# Patient Record
Sex: MALE | Race: BLACK OR AFRICAN AMERICAN | Employment: UNEMPLOYED | URBAN - METROPOLITAN AREA
[De-identification: names, ages, dates, MRNs, and addresses within clinical notes are randomized per-mention and may not be internally consistent; named-entity substitution may affect disease eponyms.]

---

## 2018-05-15 ENCOUNTER — TELEPHONE (OUTPATIENT)
Dept: FAMILY MEDICINE CLINIC | Age: 48
End: 2018-05-15

## 2018-05-15 NOTE — TELEPHONE ENCOUNTER
Patient notified by voice mail that we do not have a new patient appointment any sooner than patient already has scheduled. If a sooner appointment is needed patient can check with our [facility] office for availability.

## 2018-06-11 ENCOUNTER — HOSPITAL ENCOUNTER (INPATIENT)
Age: 48
LOS: 11 days | Discharge: HOME | DRG: 885 | End: 2018-06-22
Attending: PSYCHIATRY & NEUROLOGY | Admitting: PSYCHIATRY & NEUROLOGY
Payer: MEDICARE

## 2018-06-11 PROBLEM — F20.9 SCHIZOPHRENIA (HCC): Status: ACTIVE | Noted: 2018-06-11

## 2018-06-11 PROCEDURE — 65220000003 HC RM SEMIPRIVATE PSYCH

## 2018-06-11 RX ORDER — ACETAMINOPHEN 325 MG/1
650 TABLET ORAL
Status: DISCONTINUED | OUTPATIENT
Start: 2018-06-11 | End: 2018-06-22 | Stop reason: HOSPADM

## 2018-06-11 RX ORDER — BENZTROPINE MESYLATE 2 MG/1
2 TABLET ORAL
Status: DISCONTINUED | OUTPATIENT
Start: 2018-06-11 | End: 2018-06-22 | Stop reason: HOSPADM

## 2018-06-11 RX ORDER — IBUPROFEN 200 MG
1 TABLET ORAL
Status: DISCONTINUED | OUTPATIENT
Start: 2018-06-11 | End: 2018-06-22 | Stop reason: HOSPADM

## 2018-06-11 RX ORDER — LORAZEPAM 1 MG/1
1 TABLET ORAL
Status: DISCONTINUED | OUTPATIENT
Start: 2018-06-11 | End: 2018-06-22 | Stop reason: HOSPADM

## 2018-06-11 RX ORDER — ZOLPIDEM TARTRATE 10 MG/1
10 TABLET ORAL
Status: DISCONTINUED | OUTPATIENT
Start: 2018-06-11 | End: 2018-06-22 | Stop reason: HOSPADM

## 2018-06-11 RX ORDER — IBUPROFEN 400 MG/1
400 TABLET ORAL
Status: DISCONTINUED | OUTPATIENT
Start: 2018-06-11 | End: 2018-06-22 | Stop reason: HOSPADM

## 2018-06-11 RX ORDER — ADHESIVE BANDAGE
30 BANDAGE TOPICAL DAILY PRN
Status: DISCONTINUED | OUTPATIENT
Start: 2018-06-11 | End: 2018-06-22 | Stop reason: HOSPADM

## 2018-06-11 RX ORDER — OLANZAPINE 5 MG/1
5 TABLET ORAL
Status: DISCONTINUED | OUTPATIENT
Start: 2018-06-11 | End: 2018-06-22 | Stop reason: HOSPADM

## 2018-06-11 RX ORDER — LORAZEPAM 2 MG/ML
2 INJECTION INTRAMUSCULAR
Status: DISCONTINUED | OUTPATIENT
Start: 2018-06-11 | End: 2018-06-22 | Stop reason: HOSPADM

## 2018-06-11 RX ORDER — BENZTROPINE MESYLATE 1 MG/ML
2 INJECTION INTRAMUSCULAR; INTRAVENOUS
Status: DISCONTINUED | OUTPATIENT
Start: 2018-06-11 | End: 2018-06-22 | Stop reason: HOSPADM

## 2018-06-11 NOTE — IP AVS SNAPSHOT
[address] [facility] 
 
 
 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 
[phone] None   None (395) Patient stated that they have no PCP Discharge Orders None A check tom indicates which time of day the medication should be taken. My Medications START taking these medications Instructions Each Dose to Equal  
 Morning Noon Evening Bedtime ARIPiprazole 30 mg tablet Commonly known as:  ABILIFY Start taking on:  6/23/2018 Your last dose was: Your next dose is: Take 1 Tab by mouth daily for 7 days. Indications: Schizophrenia 30 mg  
    
   
   
   
  
 divalproex  mg ER tablet Commonly known as:  DEPAKOTE ER Your last dose was: Your next dose is: Take 3 Tabs by mouth nightly for 7 days. Indications: Schizophrenia  
 1500 mg Where to Get Your Medications Information on where to get these meds will be given to you by the nurse or doctor. ! Ask your nurse or doctor about these medications ARIPiprazole 30 mg tablet  
 divalproex  mg ER tablet Discharge Instructions DISCHARGE SUMMARY from Nurse PATIENT INSTRUCTIONS: 
What to do at Home: Recommended activity: Activity as tolerated *  Please give a list of your current medications to your Primary Care Provider. *  Please update this list whenever your medications are discontinued, doses are 
    changed, or new medications (including over-the-counter products) are added. *  Please carry medication information at all times in case of emergency situations. These are general instructions for a healthy lifestyle: No smoking/ No tobacco products/ Avoid exposure to second hand smoke Surgeon General's Warning:  Quitting smoking now greatly reduces serious risk to your health. Obesity, smoking, and sedentary lifestyle greatly increases your risk for illness A healthy diet, regular physical exercise & weight monitoring are important for maintaining a healthy lifestyle The discharge information has been reviewed with the patient. The patient verbalized understanding. Discharge medications reviewed with the patient and appropriate educational materials and side effects teaching were provided. ___________________________________________________________________________________________________________________________________ Minor Ronde If I feel I am at risk of hurting myself or others, I will call the crisis office and speak with a crisis worker who will assist me during my crisis. Trae [facility]  [phone] [address] [place] [address] [phone] [place] [address]  [phone] [address]-   [phone] [place]-  [phone]  [facility]  [phone] [address] [place] [address]  [phone] [employer] Announcement We are excited to announce that we are making your provider's discharge notes available to you in [employer]. You will see these notes when they are completed and signed by the physician that discharged you from your recent hospital stay.   If you have any questions or concerns about any information you see in [employer], please call the [employer] Information Department where you were seen or reach out to your Primary Care Provider for more information about your plan of care. Introducing [facility]! [facility] introduces Mi[employer] patient portal. Now you can access parts of your medical record, email your doctor's office, and request medication refills online. 1. In your internet browser, go to [email or web address] 2. Click on the First Time User? Click Here link in the Sign In box. You will see the New Member Sign Up page. 3. Enter your Mi[employer] Access Code exactly as it appears below. You will not need to use this code after youve completed the sign-up process. If you do not sign up before the expiration date, you must request a new code. · [employer] Access Code: [email or web address] Expires: 9/16/2018  9:10 AM 
 
4. Enter the last four digits of your Social Security Number (xxxx) and Date of Birth (mm/dd/yyyy) as indicated and click Submit. You will be taken to the next sign-up page. 5. Create a Mi[employer] ID. This will be your [employer] login ID and cannot be changed, so think of one that is secure and easy to remember. 6. Create a [employer] password. You can change your password at any time. 7. Enter your Password Reset Question and Answer. This can be used at a later time if you forget your password. 8. Enter your e-mail address. You will receive e-mail notification when new information is available in [address] 9. Click Sign Up. You can now view and download portions of your medical record. 10. Click the Download Summary menu link to download a portable copy of your medical information. If you have questions, please visit the Frequently Asked Questions section of the [employer] website. Remember, MiNOW[employer]less is NOT to be used for urgent needs. For medical emergencies, dial 911. Now available from your iPhone and Android! Introducing [employer] As a [employer] patient, I wanted to make you aware of our electronic visit tool called Walker ClarkAggios. [employer] 24/7 allows you to connect within minutes with a medical provider 24 hours a day, seven days a week via a mobile device or tablet or logging into a secure website from your computer. You can access Walker Clarkfin from anywhere in the [place]. A virtual visit might be right for you when you have a simple condition and feel like you just dont want to get out of bed, or cant get away from work for an appointment, when your regular [employer] provider is not available (evenings, weekends or holidays), or when youre out of town and need minor care. Electronic visits cost only $49 and if the [employer] 24/7 provider determines a prescription is needed to treat your condition, one can be electronically transmitted to a nearby pharmacy*. Please take a moment to enroll today if you have not already done so. The enrollment process is free and takes just a few minutes. To enroll, please download the [employer] 24/7 lucy to your tablet or phone, or visit [email or web address] to enroll on your computer. And, as an [address] [place] [address] patient with a [employer] account, the results of your visits will be scanned into your electronic medical record and your primary care provider will be able to view the scanned results. We urge you to continue to see your regular [employer] provider for your ongoing medical care. And while your primary care provider may not be the one available when you seek a Walker Mistryvirginiafin virtual visit, the peace of mind you get from getting a real diagnosis real time can be priceless. For more information on Walker Fedevirginiafin, view our Frequently Asked Questions (FAQs) at [email or web address]. Sincerely, 
 
Karolina Colby MD 
Chief Medical Officer [facility] *:  certain medications cannot be prescribed via [employer] Unresulted tests-please follow up with your PCP on these results Procedure/Test Authorizing Provider Deven Velasco MD  
 LIPID PANEL Jannette Jacob MD  
 TSH 3RD GENERATION Jannette Jacob MD  
 VALPROIC ACID Wen Harvey MD  
 VALPROIC ACID Cristian Stewart MD  
  
Providers Seen During Your Hospitalization Provider Specialty Primary office phone Wen Harvey MD Psychiatry [phone] Cristian Stewart MD Psychiatry [phone] Your Primary Care Physician (PCP) Primary Care Physician Office Phone Office Fax NONE ** None ** ** None ** You are allergic to the following No active allergies Recent Documentation Height Weight BMI  
  
  
 1.575 m 67.6 kg 25.58 kg/m2 Emergency Contacts Name Discharge Info Relation Home Work Mobile [employer] [1] Brother [24] [phone] Ramón Martines [1] Mother [14] [phone] Caitlin Ponce [1] Brother [24] [phone] Patient Belongings The following personal items are in your possession at time of discharge: 
  Dental Appliances: None         Home Medications: Locked   Jewelry: None  Clothing: Shirt, With patient    Other Valuables: None  Personal Items Sent to Safe: wallet, 2 cell phones Please provide this summary of care documentation to your next provider. Signatures-by signing, you are acknowledging that this After Visit Summary has been reviewed with you and you have received a copy. Patient Signature:  ____________________________________________________________ Date:  ____________________________________________________________  
  
Arna [place] Provider Signature:  ____________________________________________________________ Date:  ____________________________________________________________

## 2018-06-11 NOTE — IP AVS SNAPSHOT
Summary of Care Report The Summary of Care report has been created to help improve care coordination. Users with access to [employer] or [address] (Web-based application) may access additional patient information including the Discharge Summary. If you are not currently a [address] user and need more information, please call the number listed below in the [place] [address] section and ask to be connected with Medical Records. Facility Information Name Address Phone [facility] [address] [place] [phone] Patient Information Patient Name Sex CAIO Pizano ([id number]) Male 1970 Discharge Information Admitting Provider Service Area Unit Satish Vail MD / [address] [place] [address] / [phone] Discharge Provider Discharge Date/Time Discharge Disposition Destination (none) 2018 Afternoon (Pending) AHR (none) Patient Language Language ENGLISH [13] Hospital Problems as of 2018  Never Reviewed Class Noted - Resolved Last Modified POA Active Problems * (Principal)Schizophrenia ([facility] [address]  2018 - Present 2018 by Padma Dorman MD Unknown Entered by Satish Vail MD  
  Kallmann syndrome [facility])  2018 - Present 2018 by Satish Vail MD Yes Entered by Satish Vail MD  
  
You are allergic to the following No active allergies Current Discharge Medication List  
  
START taking these medications Dose & Instructions Dispensing Information Comments ARIPiprazole 30 mg tablet Commonly known as:  ABILIFY Start taking on:  2018 Dose:  30 mg Take 1 Tab by mouth daily for 7 days. Indications: Schizophrenia Quantity:  7 Tab Refills:  0  
   
 divalproex  mg ER tablet Commonly known as:  DEPAKOTE ER Dose:  1500 mg Take 3 Tabs by mouth nightly for 7 days. Indications: Schizophrenia Quantity:  21 Tab Refills:  0 Follow-up Information Follow up With Details Comments Contact Info [address] [place] [address]  Follow up to establish psychiatric & case management services through same day access Mon-Wed/7:30-3pm, Thurs./7:30-5:30pm, Fri./8-11:00am [address] Dereck Browne [place] [address] [place] [address] 
[phone] None   None (395) Patient stated that they have no PCP Discharge Instructions DISCHARGE SUMMARY from Nurse PATIENT INSTRUCTIONS: 
What to do at Home: 
Recommended activity: Activity as tolerated *  Please give a list of your current medications to your Primary Care Provider. *  Please update this list whenever your medications are discontinued, doses are 
    changed, or new medications (including over-the-counter products) are added. *  Please carry medication information at all times in case of emergency situations. These are general instructions for a healthy lifestyle: No smoking/ No tobacco products/ Avoid exposure to second hand smoke Surgeon General's Warning:  Quitting smoking now greatly reduces serious risk to your health. Obesity, smoking, and sedentary lifestyle greatly increases your risk for illness A healthy diet, regular physical exercise & weight monitoring are important for maintaining a healthy lifestyle The discharge information has been reviewed with the patient. The patient verbalized understanding. Discharge medications reviewed with the patient and appropriate educational materials and side effects teaching were provided. ___________________________________________________________________________________________________________________________________ Lambert Rojas If I feel I am at risk of hurting myself or others, I will call the crisis office and speak with a crisis worker who will assist me during my crisis. Claudio Vicente [facility]  [phone] [address] [place] [address] [phone] [place] [address]  [phone] [employer] [address]-   [phone] [employer]-  [phone] 8[address] [facility]  [phone] [facility]  [phone] Chart Review Routing History No Routing History on File

## 2018-06-11 NOTE — IP AVS SNAPSHOT
[address] [facility] 
 
 
 [address] Patient: Robby Gambino MRN: [id number] PBU:96/9/1363 A check tom indicates which time of day the medication should be taken. My Medications START taking these medications Instructions Each Dose to Equal  
 Morning Noon Evening Bedtime ARIPiprazole 30 mg tablet Commonly known as:  ABILIFY Start taking on:  6/23/2018 Your last dose was: Your next dose is: Take 1 Tab by mouth daily for 7 days. Indications: Schizophrenia 30 mg  
    
   
   
   
  
 divalproex  mg ER tablet Commonly known as:  DEPAKOTE ER Your last dose was: Your next dose is: Take 3 Tabs by mouth nightly for 7 days. Indications: Schizophrenia  
 1500 mg Where to Get Your Medications Information on where to get these meds will be given to you by the nurse or doctor. ! Ask your nurse or doctor about these medications ARIPiprazole 30 mg tablet  
 divalproex  mg ER tablet

## 2018-06-12 LAB
CHOLEST SERPL-MCNC: 191 MG/DL
GLUCOSE P FAST SERPL-MCNC: 85 MG/DL (ref 65–100)
HDLC SERPL-MCNC: 71 MG/DL
HDLC SERPL: 2.7 {RATIO} (ref 0–5)
LDLC SERPL CALC-MCNC: 112.6 MG/DL (ref 0–100)
LIPID PROFILE,FLP: ABNORMAL
TRIGL SERPL-MCNC: 37 MG/DL (ref ?–150)
TSH SERPL DL<=0.05 MIU/L-ACNC: 1.21 UIU/ML (ref 0.36–3.74)
VLDLC SERPL CALC-MCNC: 7.4 MG/DL

## 2018-06-12 PROCEDURE — 36415 COLL VENOUS BLD VENIPUNCTURE: CPT | Performed by: PSYCHIATRY & NEUROLOGY

## 2018-06-12 PROCEDURE — 80061 LIPID PANEL: CPT | Performed by: PSYCHIATRY & NEUROLOGY

## 2018-06-12 PROCEDURE — 74011000250 HC RX REV CODE- 250: Performed by: PSYCHIATRY & NEUROLOGY

## 2018-06-12 PROCEDURE — 65220000003 HC RM SEMIPRIVATE PSYCH

## 2018-06-12 PROCEDURE — 74011250636 HC RX REV CODE- 250/636: Performed by: PSYCHIATRY & NEUROLOGY

## 2018-06-12 PROCEDURE — 84443 ASSAY THYROID STIM HORMONE: CPT | Performed by: PSYCHIATRY & NEUROLOGY

## 2018-06-12 PROCEDURE — 82947 ASSAY GLUCOSE BLOOD QUANT: CPT | Performed by: PSYCHIATRY & NEUROLOGY

## 2018-06-12 RX ORDER — HALOPERIDOL 5 MG/1
5 TABLET ORAL 2 TIMES DAILY
Status: DISCONTINUED | OUTPATIENT
Start: 2018-06-12 | End: 2018-06-13

## 2018-06-12 RX ORDER — DIVALPROEX SODIUM 500 MG/1
500 TABLET, DELAYED RELEASE ORAL 2 TIMES DAILY
Status: DISCONTINUED | OUTPATIENT
Start: 2018-06-12 | End: 2018-06-13

## 2018-06-12 RX ADMIN — LORAZEPAM 2 MG: 2 INJECTION INTRAMUSCULAR; INTRAVENOUS at 12:07

## 2018-06-12 RX ADMIN — WATER 20 MG: 1 INJECTION INTRAMUSCULAR; INTRAVENOUS; SUBCUTANEOUS at 12:07

## 2018-06-12 NOTE — H&P
INITIAL PSYCHIATRIC EVALUATION            IDENTIFICATION:    Patient Name  Norwood Bernheim   Date of Birth 1970   [facility] [id number]   Medical Record Number  [id number]      Age  52 y.o. PCP None   Admit date:  6/11/2018    Room Number  764/51  @ [address]   Date of Service  6/12/2018            HISTORY         REASON FOR HOSPITALIZATION:  CC:  Pt admitted under a temporary USP order (TDO)  for  psychosis  proving to be an imminent danger to self and an inability to care for self. HISTORY OF PRESENT ILLNESS:    The patient, Norwood Bernheim, is a 52 y.o. BLACK [place]  male with a past psychiatric history significant for psychosis and aggression , who presents at this time with complaints of (and/or evidence of) the following emotional symptoms: agitation, delusions, psychotic behavior, paranoid behavior and psychosis. Additional symptomatology include increased irritability and poor concentration. The above symptoms have been present for months. These symptoms are of moderate severity. These symptoms are constant in nature. The patient's condition has been precipitated by legal issues and psychosocial stressors (homelessness  ). He denied using drugs and he stated he is not to be here and he was bailed out of [place] and he was trespassing and he does not want to be here and he had medications side effects like gynecomastia     ALLERGIES: No Known Allergies   MEDICATIONS PRIOR TO ADMISSION:   No prescriptions prior to admission. PAST MEDICAL HISTORY:   No past medical history on file. No past surgical history on file. SOCIAL HISTORY: single    Social History     Social History    Marital status: SINGLE     Spouse name: N/A    Number of children: N/A    Years of education: N/A     Occupational History    Not on file.      Social History Main Topics    Smoking status: Not on file    Smokeless tobacco: Not on file    Alcohol use Not on file    Drug use: Not on file    Sexual activity: Not on file     Other Topics Concern    Not on file     Social History Narrative      FAMILY HISTORY: History reviewed. No pertinent family history. No family history on file. REVIEW OF SYSTEMS:   History obtained from the patient  Pertinent items are noted in the History of Present Illness. All other Systems reviewed and are considered negative. MENTAL STATUS EXAM & VITALS     MENTAL STATUS EXAM (MSE):    MSE FINDINGS ARE WITHIN NORMAL LIMITS (WNL) UNLESS OTHERWISE STATED BELOW. ( ALL OF THE BELOW CATEGORIES OF THE MSE HAVE BEEN REVIEWED (reviewed 6/12/2018) AND UPDATED AS DEEMED APPROPRIATE )  General Presentation age appropriate, guarded   Orientation oriented to time, place and person   Vital Signs  See below (reviewed 6/12/2018); Vital Signs (BP, Pulse, & Temp) are within normal limits if not listed below.    Gait and Station Stable/steady, no ataxia   Musculoskeletal System No extrapyramidal symptoms (EPS); no abnormal muscular movements or Tardive Dyskinesia (TD); muscle strength and tone are within normal limits   Language No aphasia or dysarthria   Speech:  hyperverbal   Thought Processes illogical; fast rate of thoughts; poor abstract reasoning/computation   Thought Associations tangential   Thought Content paranoid delusions, grandiose delusions, preoccupations and internally preoccupied   Suicidal Ideations no plan  and no intention   Homicidal Ideations no plan  and no intention   Mood:  irritable   Affect:  constricted   Memory recent  good   Memory remote:  good   Concentration/Attention:  fair   Fund of Knowledge average   Insight:  poor   Reliability poor   Judgment:  poor          VITALS:     Patient Vitals for the past 24 hrs:   Temp Pulse Resp BP   06/12/18 0633 - 72 16 112/61   06/11/18 2158 99.1 °F (37.3 °C) 80 16 102/67     Wt Readings from Last 3 Encounters:   No data found for Wt     Temp Readings from Last 3 Encounters:   No data found for Temp BP Readings from Last 3 Encounters:   06/12/18 112/61     Pulse Readings from Last 3 Encounters:   06/12/18 72            DATA     LABORATORY DATA:  Labs Reviewed   LIPID PANEL - Abnormal; Notable for the following:        Result Value    LDL, calculated 112.6 (*)     All other components within normal limits   TSH 3RD GENERATION   GLUCOSE, FASTING     Admission on 06/11/2018   Component Date Value Ref Range Status    TSH 06/12/2018 1.21  0.36 - 3.74 uIU/mL Final    LIPID PROFILE 06/12/2018        Final    Cholesterol, total 06/12/2018 191  <200 MG/DL Final    Triglyceride 06/12/2018 37  <150 MG/DL Final    HDL Cholesterol 06/12/2018 71  MG/DL Final    LDL, calculated 06/12/2018 112.6* 0 - 100 MG/DL Final    VLDL, calculated 06/12/2018 7.4  MG/DL Final    CHOL/HDL Ratio 06/12/2018 2.7  0 - 5.0   Final    Glucose 06/12/2018 85  65 - 100 MG/DL Final        RADIOLOGY REPORTS:  No results found for this or any previous visit. No results found.            MEDICATIONS       ALL MEDICATIONS  Current Facility-Administered Medications   Medication Dose Route Frequency    ziprasidone (GEODON) 20 mg in sterile water (preservative free) 1 mL injection  20 mg IntraMUSCular BID PRN    OLANZapine (ZyPREXA) tablet 5 mg  5 mg Oral Q6H PRN    benztropine (COGENTIN) tablet 2 mg  2 mg Oral BID PRN    benztropine (COGENTIN) injection 2 mg  2 mg IntraMUSCular BID PRN    LORazepam (ATIVAN) injection 2 mg  2 mg IntraMUSCular Q4H PRN    LORazepam (ATIVAN) tablet 1 mg  1 mg Oral Q4H PRN    zolpidem (AMBIEN) tablet 10 mg  10 mg Oral QHS PRN    acetaminophen (TYLENOL) tablet 650 mg  650 mg Oral Q4H PRN    ibuprofen (MOTRIN) tablet 400 mg  400 mg Oral Q8H PRN    magnesium hydroxide (MILK OF MAGNESIA) 400 mg/5 mL oral suspension 30 mL  30 mL Oral DAILY PRN    nicotine (NICODERM CQ) 21 mg/24 hr patch 1 Patch  1 Patch TransDERmal DAILY PRN      SCHEDULED MEDICATIONS  Current Facility-Administered Medications   Medication Dose Route Frequency                ASSESSMENT & PLAN        The patient, Da Hazel, is a 52 y.o.  male who presents at this time for treatment of the following diagnoses:  Patient Active Hospital Problem List:   Schizophrenia [facility]) (6/11/2018)    Assessment: Paranoid ideation and delusion     Plan: Haldol 5 mg po bid and Depakote 500 mg po bid           I will continue to monitor blood levels (Depakote, Tegretol, lithium, clozapine---a drug with a narrow therapeutic index= NTI) and associated labs for drug therapy implemented that require intense monitoring for toxicity as deemed appropriate based on current medication side effects and pharmacodynamically determined drug 1/2 lives. A coordinated, multidisplinary treatment team (includes the nurse, unit pharmcist,  and writer) round was conducted for this initial evaluation with the patient present. The following regarding medications was addressed during rounds with patient:   the risks and benefits of the proposed medication. The patient was given the opportunity to ask questions. Informed consent given to the use of the above medications. I will continue to adjust psychiatric and non-psychiatric medications (see above \"medication\" section and orders section for details) as deemed appropriate & based upon diagnoses and response to treatment. I have reviewed admission (and previous/old) labs and medical tests in the EHR and or transferring hospital documents. I will continue to order blood tests/labs and diagnostic tests as deemed appropriate and review results as they become available (see orders for details). I have reviewed old psychiatric and medical records available in the EHR. I Will order additional psychiatric records from other institutions to further elucidate the nature of patient's psychopathology and review once available.     I will gather additional collateral information from friends, family and o/p treatment team to further elucidate the nature of patient's psychopathology and baselline level of psychiatric functioning.       ESTIMATED LENGTH OF STAY:    3       STRENGTHS:  Exercising self-direction/Resourceful, Access to housing/residential stability and Interpersonal/supportive relationships (family, friends, peers)                                        SIGNED:    Magy Pak MD  6/12/2018

## 2018-06-12 NOTE — PROGRESS NOTES
Laboratory monitoring for mood stabilizer and antipsychotics:    Recommended baseline monitoring has been completed based on this patient's current medication regimen. The following labs have been ordered to complete baseline monitoring:N/A     The following labs were completed at transferring facility: WBC 3.37, Hgb 11.1, Hct 35.5, Plts 181, sodium 140, potassium 3.5, BUN 13, creatinine 0.89, glucose 141, calcium 9, AST 35, ALT 40, alk phos 68, total bili 0.4, albumin 3.5, UDS (-), BAL none detected, UA completed      The patient is currently taking the following medication(s):   Current Facility-Administered Medications   Medication Dose Route Frequency    ARIPiprazole (ABILIFY) tablet 10 mg  10 mg Oral DAILY    Or    haloperidol lactate (HALDOL) injection 2 mg  2 mg IntraMUSCular DAILY    divalproex ER (DEPAKOTE ER) 24 hour tablet 1,000 mg  1,000 mg Oral QHS    Or    LORazepam (ATIVAN) injection 1 mg +++COURT ORDERED MEDICATION FOR REFUSAL OF PO DEPAKOTE+++  1 mg IntraMUSCular QHS       Serum Drug Level(s)  N/A      Height, Weight, BMI Estimation  Estimated body mass index is 25.58 kg/(m^2) as calculated from the following:    Height as of this encounter: 162.6 cm (64\"). Weight as of this encounter: 67.6 kg (149 lb). Renal Function, Hepatic Function and Chemistry  CrCl cannot be calculated (No order found. ).     Lab Results   Component Value Date/Time    Glucose 85 06/12/2018 04:38 AM       Lab Results   Component Value Date/Time    Glucose 85 06/12/2018 04:38 AM       No results found for: HBA1C, HGBE8, SWF4SOHA    Hematology  See above    Lipids  Lab Results   Component Value Date/Time    Cholesterol, total 191 06/12/2018 04:38 AM    HDL Cholesterol 71 06/12/2018 04:38 AM    LDL, calculated 112.6 (H) 06/12/2018 04:38 AM    Triglyceride 37 06/12/2018 04:38 AM    CHOL/HDL Ratio 2.7 06/12/2018 04:38 AM       Thyroid Function    Lab Results   Component Value Date/Time    TSH 1.21 06/12/2018 04:38 AM Vitals  Visit Vitals    /79    Pulse 87    Temp 98 °F (36.7 °C)    Resp 20    Ht 162.6 cm (64\")    Wt 67.6 kg (149 lb)    BMI 25.58 kg/m2     QUINTIN YenD, BCPS  Contact: [phone]

## 2018-06-12 NOTE — PROGRESS NOTES
Pt slept for 6.5 hours and had no signs or symptoms of distress. Pt had no issues through the night. Q15 safety monitoring rounds continued.

## 2018-06-12 NOTE — BH NOTES
Patient refused Depakote 1000 am dose. Stated \" no one knows my medical history, how can they give me meds? \"

## 2018-06-12 NOTE — BH NOTES
ADMISSION NOTE:    Pt was admitted to the unit under a TDO for the professional services of Dr. Irving White. Per report pt has been delusional, paranoid, and perseverative. Per report pt has had poor sleep and poor appetite. Per report client was release from [facility] into the hospital with misdemeanor charges for property damage and trespassing. Pt was cooperative with admission assessment and skin assessment. Orders received from attending Dr. Irving White. Will monitor pt q15 minutes for safety and support.

## 2018-06-12 NOTE — BH NOTES
Patient was agitated after court hearing. Patient was curing and fussing out loud. Patient was not redirectable. Patient threw tray. Patient then  started throwing food in dayroom. patient then became threatening to staff. Code micah was called called. Was given ativan 2mg IM and Geodon 20 IM for psychosis and agitation. Patient in room at this time. Will continue to monitor patient and assess needs.

## 2018-06-12 NOTE — CONSULTS
Medical Consult for [facility] Patient    Consult H&P   dictated, see patient chart    Impression:    Keagan Hubbard a 52 y.o. male with past medical history of mental health disease presents with behavioral health problems of delusions and paranios admitted for further psychiatric evaluation and treatment. Plan:   1. Psychiatry to manage mental health issues. 2. Medically stable at this time, will follow up as needed. 3. No VTE prophylaxis indicated or necessary at this time.      Thank you  Anselmo Gottron, MD  6/12/2018, 6:19 PM

## 2018-06-13 PROBLEM — E23.0 KALLMANN SYNDROME (HCC): Status: ACTIVE | Noted: 2018-06-13

## 2018-06-13 PROCEDURE — 65220000003 HC RM SEMIPRIVATE PSYCH

## 2018-06-13 PROCEDURE — 74011250636 HC RX REV CODE- 250/636: Performed by: PSYCHIATRY & NEUROLOGY

## 2018-06-13 PROCEDURE — 74011250637 HC RX REV CODE- 250/637: Performed by: PSYCHIATRY & NEUROLOGY

## 2018-06-13 RX ORDER — HALOPERIDOL 5 MG/ML
2 INJECTION INTRAMUSCULAR DAILY
Status: DISCONTINUED | OUTPATIENT
Start: 2018-06-13 | End: 2018-06-15

## 2018-06-13 RX ORDER — LORAZEPAM 2 MG/ML
1 INJECTION INTRAMUSCULAR
Status: DISCONTINUED | OUTPATIENT
Start: 2018-06-13 | End: 2018-06-18

## 2018-06-13 RX ORDER — ARIPIPRAZOLE 5 MG/1
5 TABLET ORAL DAILY
Status: DISCONTINUED | OUTPATIENT
Start: 2018-06-13 | End: 2018-06-15

## 2018-06-13 RX ORDER — DIVALPROEX SODIUM 500 MG/1
1000 TABLET, EXTENDED RELEASE ORAL
Status: DISCONTINUED | OUTPATIENT
Start: 2018-06-13 | End: 2018-06-18

## 2018-06-13 RX ORDER — ARIPIPRAZOLE 5 MG/1
5 TABLET ORAL DAILY
Status: DISCONTINUED | OUTPATIENT
Start: 2018-06-13 | End: 2018-06-13

## 2018-06-13 RX ADMIN — ARIPIPRAZOLE 5 MG: 5 TABLET ORAL at 17:42

## 2018-06-13 RX ADMIN — DIVALPROEX SODIUM 1000 MG: 500 TABLET, FILM COATED, EXTENDED RELEASE ORAL at 21:17

## 2018-06-13 RX ADMIN — LORAZEPAM 2 MG: 2 INJECTION INTRAMUSCULAR; INTRAVENOUS at 21:43

## 2018-06-13 NOTE — BH NOTES
GROUP THERAPY PROGRESS NOTE    The patient Jud Aggarwal a 52 y.o. male is participating in Creative Expression Group. Group time: 1 hour    Personal goal for participation: To concentrate on selected task    Goal orientation: social    Group therapy participation: active    Therapeutic interventions reviewed and discussed: Crafts, games, music    Impression of participation: The patient was attentive.     Jaspreet Zaragoza  6/13/2018  4:55 PM

## 2018-06-13 NOTE — BH NOTES
Pt calm and cooperative with staff. Visible in milieu, no behavior issues. No s/s of distress noted, no complaints voiced. Will continue to monitor Q 15 minutes for safety.

## 2018-06-13 NOTE — PROGRESS NOTES
Problem: Altered Thought Process (Adult/Pediatric)  Goal: *STG: Complies with medication therapy  Outcome: Progressing Towards Goal  Pt is alert. He is compliant with medications although he became agitated, he was redirectable. Pt has concerns about the medication giving him breast and changing his voice. Attempted to give pt medication information but he was not receptive. Will continue to monitor pt q15 minutes.
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
## 2018-06-13 NOTE — PROGRESS NOTES
Problem: Altered Thought Process (Adult/Pediatric)  Goal: *STG: Participates in treatment plan  Outcome: Progressing Towards Goal  Pt rested quietly in bed with eyes closed. No signs/symptoms of distress, agitation, or anxiety. Will monitor for changes. Q 15 minute checks continue.  Pt slept 6.5 hrs

## 2018-06-13 NOTE — PROGRESS NOTES
Problem: Altered Thought Process (Adult/Pediatric)  Goal: *STG: Participates in treatment plan  Outcome: Progressing Towards Goal  Pt alert. Participated in treatment team,grandiose. Delusional.Continues to refuse medications. No insight. Visible in the dayroom,drawing. Interacting with a select peer. Will continue to monitor pt q15 minutes and assess needs.

## 2018-06-13 NOTE — PROGRESS NOTES
Mr. Quentin Franks actively participated in Spirituality Group about prayer on [facility] 22 unit     Dixie Castellon M.Div.    Paging Service [phone])

## 2018-06-13 NOTE — BH NOTES
PSYCHIATRIC PROGRESS NOTE         Patient Name  Nona Orellana   Date of Birth 1970   [facility] [id number]   Medical Record Number  [id number]      Age  52 y.o. PCP None   Admit date:  6/11/2018    Room Number  661/05  @ [facility]   Date of Service  6/13/2018          PSYCHOTHERAPY SESSION NOTE:  Length of psychotherapy session: 15 minutes    Main condition/diagnosis/issues treated during session today, 6/13/2018 : mood swings, psychosis, medical and medication    I employed Cognitive Behavioral therapy techniques, Reality-Oriented psychotherapy, as well as supportive psychotherapy in regards to various ongoing psychosocial stressors, including the following: pre-admission and current problems; housing issues; occupational issues; academic issues; legal issues; medical issues; and stress of hospitalization. Interpersonal relationship issues and psychodynamic conflicts explored. Attempts made to alleviate maladaptive patterns. We, also, worked on issues of denial & effects of substance dependency/use     Overall, patient is not progressing    Treatment Plan Update (reviewed an updated 6/13/2018) : I will modify psychotherapy tx plan by implementing more stress management strategies, building upon cognitive behavioral techniques, increasing coping skills, as well as shoring up psychological defenses). An extended energy and skill set was needed to engage pt in psychotherapy due to some of the following: resistiveness, complexity, negativity, confrontational nature, hostile behaviors, and/or severe abnormalities in thought processes/psychosis resulting in the loss of expressive/receptive language communication skills. E & M PROGRESS NOTE:         HISTORY       CC:  \"don't need to be here\"  HISTORY OF PRESENT ILLNESS/INTERVAL HISTORY:  (reviewed/updated 6/13/2018). per initial evaluation: The patient, Nona Orellana, is a 52 y.o.   [address] male with a past psychiatric history significant for psychosis and aggression , who presents at this time with complaints of (and/or evidence of) the following emotional symptoms: agitation, delusions, psychotic behavior, paranoid behavior and psychosis. Additional symptomatology include increased irritability and poor concentration. The above symptoms have been present for months. These symptoms are of moderate severity. These symptoms are constant in nature. The patient's condition has been precipitated by legal issues and psychosocial stressors (homelessness  ). He denied using drugs and he stated he is not to be here and he was bailed out of [place] and he was trespassing and he does not want to be here and he had medications side effects like gynecomastia       Aurea Coughlin presents/reports/evidences the following emotional symptoms today, 6/13/2018:agitation and psychotic behavior. The above symptoms have been present for months. These symptoms are of severe severity. The symptoms are constant in nature. Additional symptomatology and features include paranoid behavior, agitation, difficulty sleeping, increased irritability, poor concentration and problem with medication. Pt received prn med for agitation and combativeness. SIDE EFFECTS: (reviewed/updated 6/13/2018)  None reported or admitted to. No noted toxicity with use of current med   ALLERGIES:(reviewed/updated 6/13/2018)  No Known Allergies   MEDICATIONS PRIOR TO ADMISSION:(reviewed/updated 6/13/2018)  No prescriptions prior to admission. PAST MEDICAL HISTORY: Past medical history from the initial psychiatric evaluation has been reviewed (reviewed/updated 6/13/2018) with no additional updates (I asked patient and no additional past medical history provided). No past medical history on file. No past surgical history on file.    SOCIAL HISTORY: Social history from the initial psychiatric evaluation has been reviewed (reviewed/updated 6/13/2018) with no additional updates (I asked patient and no additional social history provided). Social History     Social History    Marital status: SINGLE     Spouse name: N/A    Number of children: N/A    Years of education: N/A     Occupational History    Not on file. Social History Main Topics    Smoking status: Not on file    Smokeless tobacco: Not on file    Alcohol use Not on file    Drug use: Not on file    Sexual activity: Not on file     Other Topics Concern    Not on file     Social History Narrative      FAMILY HISTORY: Family history from the initial psychiatric evaluation has been reviewed (reviewed/updated 6/13/2018) with no additional updates (I asked patient and no additional family history provided). No family history on file. REVIEW OF SYSTEMS: (reviewed/updated 6/13/2018)  Appetite:no change from normal   Sleep: fitful   All other Review of Systems: Psychological ROS: positive for - behavioral disorder, concentration difficulties, hostility, mood swings, sleep disturbances and paranoid          [facility] ([facility]):    [facility] FINDINGS ARE WITHIN NORMAL LIMITS (WNL) UNLESS OTHERWISE STATED BELOW. ( ALL OF THE BELOW CATEGORIES OF THE [facility] HAVE BEEN REVIEWED (reviewed 6/13/2018) AND UPDATED AS DEEMED APPROPRIATE )  General Presentation age appropriate and disheveled, evasive, uncooperative and unreliable   Orientation disorganized   Vital Signs  See below (reviewed 6/13/2018); Vital Signs (BP, Pulse, & Temp) are within normal limits if not listed below.    Gait and Station Stable/steady, no ataxia   Musculoskeletal System No extrapyramidal symptoms (EPS); no abnormal muscular movements or Tardive Dyskinesia (TD); muscle strength and tone are within normal limits   Language No aphasia or dysarthria   Speech:  hyperverbal and pressured   Thought Processes illogical; normal rate of thoughts; poor abstract reasoning/computation   Thought Associations circumstantial   Thought Content paranoid delusions, free of hallucinations and preoccupations   Suicidal Ideations none   Homicidal Ideations none   Mood:  hostile  and anxious    Affect:  hostile, inappropriate and increased in intensity   Memory recent  impaired   Memory remote:  intact   Concentration/Attention:  distractable   Fund of Knowledge average   Insight:  poor   Reliability poor   Judgment:  poor          VITALS:     Patient Vitals for the past 24 hrs:   Temp Pulse Resp BP   06/13/18 0530 96.6 °F (35.9 °C) 72 16 103/64     Wt Readings from Last 3 Encounters:   No data found for Wt     Temp Readings from Last 3 Encounters:   06/13/18 96.6 °F (35.9 °C)     BP Readings from Last 3 Encounters:   06/13/18 103/64     Pulse Readings from Last 3 Encounters:   06/13/18 72            DATA     LABORATORY DATA:(reviewed/updated 6/13/2018)  No results found for this or any previous visit (from the past 24 hour(s)). No results found for: VALF2, VALAC, VALP, VALPR, DS6, CRBAM, CRBAMP, CARB2, XCRBAM  No results found for: LITHM   RADIOLOGY REPORTS:(reviewed/updated 6/13/2018)  No results found.        MEDICATIONS     ALL MEDICATIONS:   Current Facility-Administered Medications   Medication Dose Route Frequency    Height & Weight needed  1 Each Other ONCE    ARIPiprazole (ABILIFY) tablet 5 mg  5 mg Oral DAILY    divalproex DR (DEPAKOTE) tablet 500 mg  500 mg Oral BID    ziprasidone (GEODON) 20 mg in sterile water (preservative free) 1 mL injection  20 mg IntraMUSCular BID PRN    OLANZapine (ZyPREXA) tablet 5 mg  5 mg Oral Q6H PRN    benztropine (COGENTIN) tablet 2 mg  2 mg Oral BID PRN    benztropine (COGENTIN) injection 2 mg  2 mg IntraMUSCular BID PRN    LORazepam (ATIVAN) injection 2 mg  2 mg IntraMUSCular Q4H PRN    LORazepam (ATIVAN) tablet 1 mg  1 mg Oral Q4H PRN    zolpidem (AMBIEN) tablet 10 mg  10 mg Oral QHS PRN    acetaminophen (TYLENOL) tablet 650 mg  650 mg Oral Q4H PRN    ibuprofen (MOTRIN) tablet 400 mg  400 mg Oral Q8H PRN    magnesium hydroxide (MILK OF MAGNESIA) 400 mg/5 mL oral suspension 30 mL  30 mL Oral DAILY PRN    nicotine (NICODERM CQ) 21 mg/24 hr patch 1 Patch  1 Patch TransDERmal DAILY PRN      SCHEDULED MEDICATIONS:   Current Facility-Administered Medications   Medication Dose Route Frequency    Height & Weight needed  1 Each Other ONCE    ARIPiprazole (ABILIFY) tablet 5 mg  5 mg Oral DAILY    divalproex DR (DEPAKOTE) tablet 500 mg  500 mg Oral BID          ASSESSMENT & PLAN     DIAGNOSES REQUIRING ACTIVE TREATMENT AND MONITORING: (reviewed/updated 6/13/2018)  Patient Active Hospital Problem List:  Schizophrenia [facility]) (6/11/2018)    Assessment: Paranoid ideation and delusion , agitation. 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 Pt not interested in lab testing or tx    I will continue to monitor blood levels (Depakote, Tegretol, lithium, clozapine---a drug with a narrow therapeutic index= NTI) and associated labs for drug therapy implemented that require intense monitoring for toxicity as deemed appropriate based on current medication side effects and pharmacodynamically determined drug 1/2 lives. In summary, Norwood Bernheim, is a 52 y.o.  male who presents with a severe exacerbation of the principal diagnosis of Schizophrenia ([address]  Patient's condition is worsening/not improving/not stable . Patient requires continued inpatient hospitalization for further stabilization, safety monitoring and medication management. I will continue to coordinate the provision of individual, milieu, occupational, group, and substance abuse therapies to address target symptoms/diagnoses as deemed appropriate for the individual patient.   A coordinated, multidisplinary treatment team round was conducted with the patient (this team consists of the nurse, psychiatric unit pharmcist,  and writer). Complete current electronic health record for patient has been reviewed today including consultant notes, ancillary staff notes, nurses and psychiatric tech notes. Suicide risk assessment completed and patient deemed to be of low risk for suicide at this time. The following regarding medications was addressed during rounds with patient:   the risks and benefits of the proposed medication. The patient was given the opportunity to ask questions. Informed consent given to the use of the above medications. Will continue to adjust psychiatric and non-psychiatric medications (see above \"medication\" section and orders section for details) as deemed appropriate & based upon diagnoses and response to treatment. I will continue to order blood tests/labs and diagnostic tests as deemed appropriate and review results as they become available (see orders for details and above listed lab/test results). I will order psychiatric records from previous [facility] hospitals to further elucidate the nature of patient's psychopathology and review once available. I will gather additional collateral information from friends, family and o/p treatment team to further elucidate the nature of patient's psychopathology and baselline level of psychiatric functioning. I certify that this patient's inpatient psychiatric hospital services furnished since the previous certification were, and continue to be, required for treatment that could reasonably be expected to improve the patient's condition, or for diagnostic study, and that the patient continues to need, on a daily basis, active treatment furnished directly by or requiring the supervision of inpatient psychiatric facility personnel.  In addition, the hospital records show that services furnished were intensive treatment services, admission or related services, or equivalent services.     EXPECTED DISCHARGE DATE/DAY: TBD     DISPOSITION: Home       Signed By:   Karthik Herrera MD  6/13/2018

## 2018-06-13 NOTE — BH NOTES
GROUP THERAPY PROGRESS NOTE    The patient Doyle joe 52 y.o. male is participating in Coping Skills Group. Group time: 45 minutes    Personal goal for participation: To participate in relaxation activity    Goal orientation:  relaxation    Group therapy participation: active    Therapeutic interventions reviewed and discussed: favorite ways to relax    Impression of participation:  The patient was attentive.     Fahad Zaragoza  6/13/2018  1:50 PM

## 2018-06-13 NOTE — CONSULTS
[address]  MR#: [id number]  : 1970  ACCOUNT #: [de-identified]   DATE OF SERVICE: 2018    REFERRING PHYSICIAN:  Whitney Ni     REASON FOR CONSULTATION:  Medical evaluation for psychiatric admission. CHIEF COMPLAINT:  Delusions, paranoia. HISTORY OF PRESENT ILLNESS:  This is a 25-year-old male who presents delusional, paranoid, required further psychiatric evaluation and treatment. The patient recently was released on parole from [facility] for property damage, and denies any chest pain, shortness of breath, nausea or vomiting or diarrhea. 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 He has a history of gynecomastia. ALLERGIES:  NONE. MEDICATIONS:  None. PAST SURGICAL HISTORY:  Bilateral testicular repair, hip fracture in the past.      SOCIAL HISTORY:  Denies any tobacco, alcohol, or illicit drug use. The patient is homeless. PHYSICAL EXAMINATION:  Temperature is 99.1, blood pressure 102/67, pulse 80, respirations 16. DATA:  All labs pending from outside, not available. IMPRESSION:  A 25-year-old male with past medical history of mental health disease, gynecomastia, some congenital disease, presents with delusions and paranoia, admitted for further psychiatric evaluation and treatment. PLAN:  1. Psychiatric management of mental health issues. 2.  Continue home meds once confirmed by nursing. 3.  Medically stable, will follow up on a p.r.n. basis. 4.  No VTE prophylaxis indicated or warranted at this time. Thank you for this consult.       Albertina Boyce MD DC / JULIO CESAR  D: 2018 22:33     T: 2018 23:04  JOB #: [id number]

## 2018-06-14 PROCEDURE — 74011250637 HC RX REV CODE- 250/637: Performed by: PSYCHIATRY & NEUROLOGY

## 2018-06-14 PROCEDURE — 65220000003 HC RM SEMIPRIVATE PSYCH

## 2018-06-14 RX ADMIN — DIVALPROEX SODIUM 1000 MG: 500 TABLET, FILM COATED, EXTENDED RELEASE ORAL at 22:05

## 2018-06-14 RX ADMIN — ARIPIPRAZOLE 5 MG: 5 TABLET ORAL at 09:06

## 2018-06-14 RX ADMIN — LORAZEPAM 1 MG: 1 TABLET ORAL at 16:59

## 2018-06-14 NOTE — BH NOTES
Pt became increasingly escalated in the dayroom, talking louder and louder. He took HS medications and became more irate throwing a cup in the dayroom. Pt was asked to take space in his room and rest. He went to his room and continued to yell expressing delusions. Pt has a roommate so he was escorted to open seclusion for a time out. He was given PRN Ativan IM without incident.

## 2018-06-14 NOTE — BH NOTES
PSYCHIATRIC PROGRESS NOTE         Patient Name  Irina Gleason   Date of Birth 1970   [facility] [id number]   Medical Record Number  [id number]      Age  52 y.o. PCP None   Admit date:  6/11/2018    Room Number  384/73  @ [facility]   Date of Service  6/14/2018          PSYCHOTHERAPY SESSION NOTE:  Length of psychotherapy session: 15 minutes    Main condition/diagnosis/issues treated during session today, 6/14/2018 : mood swings, psychosis, medical and medication    I employed Cognitive Behavioral therapy techniques, Reality-Oriented psychotherapy, as well as supportive psychotherapy in regards to various ongoing psychosocial stressors, including the following: pre-admission and current problems; housing issues; occupational issues; academic issues; legal issues; medical issues; and stress of hospitalization. Interpersonal relationship issues and psychodynamic conflicts explored. Attempts made to alleviate maladaptive patterns. We, also, worked on issues of denial & effects of substance dependency/use     Overall, patient is not progressing    Treatment Plan Update (reviewed an updated 6/14/2018) : I will modify psychotherapy tx plan by implementing more stress management strategies, building upon cognitive behavioral techniques, increasing coping skills, as well as shoring up psychological defenses). An extended energy and skill set was needed to engage pt in psychotherapy due to some of the following: resistiveness, complexity, negativity, confrontational nature, hostile behaviors, and/or severe abnormalities in thought processes/psychosis resulting in the loss of expressive/receptive language communication skills. E & M PROGRESS NOTE:         HISTORY       CC:  \"don't need to be here\"  HISTORY OF PRESENT ILLNESS/INTERVAL HISTORY:  (reviewed/updated 6/14/2018). per initial evaluation: The patient, Irina Gleason, is a 52 y.o.   Justin5 Corey [place] male with a past psychiatric history significant for psychosis and aggression , who presents at this time with complaints of (and/or evidence of) the following emotional symptoms: agitation, delusions, psychotic behavior, paranoid behavior and psychosis. Additional symptomatology include increased irritability and poor concentration. The above symptoms have been present for months. These symptoms are of moderate severity. These symptoms are constant in nature. The patient's condition has been precipitated by legal issues and psychosocial stressors (homelessness  ). He denied using drugs and he stated he is not to be here and he was bailed out of [place] and he was trespassing and he does not want to be here and he had medications side effects like gynecomastia       Taylor Terrencepranay presents/reports/evidences the following emotional symptoms today, 6/14/2018:agitation and psychotic behavior. The above symptoms have been present for months. These symptoms are of severe severity. The symptoms are constant in nature. Additional symptomatology and features include paranoid behavior, agitation, difficulty sleeping, increased irritability, poor concentration and problem with medication. Pt received prn med for agitation and combativeness. 6/14/18 he is compliant with medications and he is court order to do medications , he is paranoid and grandiose and he has no Si or HI and he sleeps better and eating well       SIDE EFFECTS: (reviewed/updated 6/14/2018)  None reported or admitted to. No noted toxicity with use of current med   ALLERGIES:(reviewed/updated 6/14/2018)  No Known Allergies   MEDICATIONS PRIOR TO ADMISSION:(reviewed/updated 6/14/2018)  No prescriptions prior to admission. PAST MEDICAL HISTORY: Past medical history from the initial psychiatric evaluation has been reviewed (reviewed/updated 6/14/2018) with no additional updates (I asked patient and no additional past medical history provided).  No past medical history on file. No past surgical history on file. SOCIAL HISTORY: Social history from the initial psychiatric evaluation has been reviewed (reviewed/updated 6/14/2018) with no additional updates (I asked patient and no additional social history provided). Social History     Social History    Marital status: SINGLE     Spouse name: N/A    Number of children: N/A    Years of education: N/A     Occupational History    Not on file. Social History Main Topics    Smoking status: Not on file    Smokeless tobacco: Not on file    Alcohol use Not on file    Drug use: Not on file    Sexual activity: Not on file     Other Topics Concern    Not on file     Social History Narrative      FAMILY HISTORY: Family history from the initial psychiatric evaluation has been reviewed (reviewed/updated 6/14/2018) with no additional updates (I asked patient and no additional family history provided). No family history on file. REVIEW OF SYSTEMS: (reviewed/updated 6/14/2018)  Appetite:no change from normal   Sleep: fitful   All other Review of Systems: Psychological ROS: positive for - behavioral disorder, concentration difficulties, hostility, mood swings, sleep disturbances and paranoid          [address] [facility] ([facility]):    [facility] FINDINGS ARE WITHIN NORMAL LIMITS (WNL) UNLESS OTHERWISE STATED BELOW. ( ALL OF THE BELOW CATEGORIES OF THE MSE HAVE BEEN REVIEWED (reviewed 6/14/2018) AND UPDATED AS DEEMED APPROPRIATE )  General Presentation age appropriate and disheveled, evasive, uncooperative and unreliable   Orientation disorganized   Vital Signs  See below (reviewed 6/14/2018); Vital Signs (BP, Pulse, & Temp) are within normal limits if not listed below.    Gait and Station Stable/steady, no ataxia   Musculoskeletal System No extrapyramidal symptoms (EPS); no abnormal muscular movements or Tardive Dyskinesia (TD); muscle strength and tone are within normal limits   Language No aphasia or dysarthria   Speech:  hyperverbal and pressured   Thought Processes illogical; normal rate of thoughts; poor abstract reasoning/computation   Thought Associations circumstantial   Thought Content paranoid delusions, free of hallucinations and preoccupations   Suicidal Ideations none   Homicidal Ideations none   Mood:  hostile  and anxious    Affect:  hostile, inappropriate and increased in intensity   Memory recent  impaired   Memory remote:  intact   Concentration/Attention:  distractable   Fund of Knowledge average   Insight:  poor   Reliability poor   Judgment:  poor          VITALS:     Patient Vitals for the past 24 hrs:   Temp Pulse Resp BP   06/14/18 0600 96.7 °F (35.9 °C) 98 18 (!) 86/61     Wt Readings from Last 3 Encounters:   No data found for Wt     Temp Readings from Last 3 Encounters:   06/14/18 96.7 °F (35.9 °C)     BP Readings from Last 3 Encounters:   06/14/18 (!) 86/61     Pulse Readings from Last 3 Encounters:   06/14/18 98            DATA     LABORATORY DATA:(reviewed/updated 6/14/2018)  No results found for this or any previous visit (from the past 24 hour(s)). No results found for: VALF2, VALAC, VALP, VALPR, DS6, CRBAM, CRBAMP, CARB2, XCRBAM  No results found for: LITHM   RADIOLOGY REPORTS:(reviewed/updated 6/14/2018)  No results found.        MEDICATIONS     ALL MEDICATIONS:   Current Facility-Administered Medications   Medication Dose Route Frequency    ARIPiprazole (ABILIFY) tablet 5 mg  5 mg Oral DAILY    Or    haloperidol lactate (HALDOL) injection 2 mg+++COURT ORDERED MEDICATION FOR REFUSAL OF PO ABILIFY+++  2 mg IntraMUSCular DAILY    divalproex ER (DEPAKOTE ER) 24 hour tablet 1,000 mg  1,000 mg Oral QHS    Or    LORazepam (ATIVAN) injection 1 mg +++COURT ORDERED MEDICATION FOR REFUSAL OF PO DEPAKOTE+++  1 mg IntraMUSCular QHS    ziprasidone (GEODON) 20 mg in sterile water (preservative free) 1 mL injection  20 mg IntraMUSCular BID PRN    OLANZapine (ZyPREXA) tablet 5 mg  5 mg Oral Q6H PRN    benztropine (COGENTIN) tablet 2 mg  2 mg Oral BID PRN    benztropine (COGENTIN) injection 2 mg  2 mg IntraMUSCular BID PRN    LORazepam (ATIVAN) injection 2 mg  2 mg IntraMUSCular Q4H PRN    LORazepam (ATIVAN) tablet 1 mg  1 mg Oral Q4H PRN    zolpidem (AMBIEN) tablet 10 mg  10 mg Oral QHS PRN    acetaminophen (TYLENOL) tablet 650 mg  650 mg Oral Q4H PRN    ibuprofen (MOTRIN) tablet 400 mg  400 mg Oral Q8H PRN    magnesium hydroxide (MILK OF MAGNESIA) 400 mg/5 mL oral suspension 30 mL  30 mL Oral DAILY PRN    nicotine (NICODERM CQ) 21 mg/24 hr patch 1 Patch  1 Patch TransDERmal DAILY PRN      SCHEDULED MEDICATIONS:   Current Facility-Administered Medications   Medication Dose Route Frequency    ARIPiprazole (ABILIFY) tablet 5 mg  5 mg Oral DAILY    Or    haloperidol lactate (HALDOL) injection 2 mg+++COURT ORDERED MEDICATION FOR REFUSAL OF PO ABILIFY+++  2 mg IntraMUSCular DAILY    divalproex ER (DEPAKOTE ER) 24 hour tablet 1,000 mg  1,000 mg Oral QHS    Or    LORazepam (ATIVAN) injection 1 mg +++COURT ORDERED MEDICATION FOR REFUSAL OF PO DEPAKOTE+++  1 mg IntraMUSCular QHS          ASSESSMENT & PLAN     DIAGNOSES REQUIRING ACTIVE TREATMENT AND MONITORING: (reviewed/updated 6/14/2018)  Patient Active Hospital Problem List:  Schizophrenia [facility]) (6/11/2018)    Assessment: Paranoid ideation and delusion , agitation. 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  Pt not interested in lab testing or tx    I will continue to monitor blood levels (Depakote, Tegretol, lithium, clozapine---a drug with a narrow therapeutic index= NTI) and associated labs for drug therapy implemented that require intense monitoring for toxicity as deemed appropriate based on current medication side effects and pharmacodynamically determined drug 1/2 lives. In summary, Lolis Butt, is a 52 y.o.  male who presents with a severe exacerbation of the principal diagnosis of Schizophrenia ([address]  Patient's condition is worsening/not improving/not stable . Patient requires continued inpatient hospitalization for further stabilization, safety monitoring and medication management. I will continue to coordinate the provision of individual, milieu, occupational, group, and substance abuse therapies to address target symptoms/diagnoses as deemed appropriate for the individual patient. A coordinated, multidisplinary treatment team round was conducted with the patient (this team consists of the nurse, psychiatric unit pharmcist,  and writer). Complete current electronic health record for patient has been reviewed today including consultant notes, ancillary staff notes, nurses and psychiatric tech notes. Suicide risk assessment completed and patient deemed to be of low risk for suicide at this time. The following regarding medications was addressed during rounds with patient:   the risks and benefits of the proposed medication. The patient was given the opportunity to ask questions. Informed consent given to the use of the above medications. Will continue to adjust psychiatric and non-psychiatric medications (see above \"medication\" section and orders section for details) as deemed appropriate & based upon diagnoses and response to treatment. I will continue to order blood tests/labs and diagnostic tests as deemed appropriate and review results as they become available (see orders for details and above listed lab/test results). I will order psychiatric records from previous [facility] hospitals to further elucidate the nature of patient's psychopathology and review once available.     I will gather additional collateral information from friends, family and o/p treatment team to further elucidate the nature of patient's psychopathology and baselline level of psychiatric functioning. I certify that this patient's inpatient psychiatric hospital services furnished since the previous certification were, and continue to be, required for treatment that could reasonably be expected to improve the patient's condition, or for diagnostic study, and that the patient continues to need, on a daily basis, active treatment furnished directly by or requiring the supervision of inpatient psychiatric facility personnel. In addition, the hospital records show that services furnished were intensive treatment services, admission or related services, or equivalent services.     EXPECTED DISCHARGE DATE/DAY: TBD     DISPOSITION: Home       Signed By:   Chasity Banegas MD  6/14/2018

## 2018-06-14 NOTE — BH NOTES
GROUP THERAPY PROGRESS NOTE    The patient Da joe 52 y.o. male is participating in [employer]. Group time: 1 hour    Personal goal for participation: To concentrate on selected task    Goal orientation: social    Group therapy participation: active    Therapeutic interventions reviewed and discussed: Crafts, games, music    Impression of participation: The patient was attentive.     Lisa Schilling  6/14/2018  5:35 PM

## 2018-06-14 NOTE — BH NOTES
Vivian Arguello Technician   [address] [facility] Notes 6/14/2018   Behavior  Patient this morning continues to be alert and oriented. The hygiene and nutrition of Johanna Ni is adequate. His behavior was appropriate in the milieu with peers and staff, but presents preoccupied. Patient continues to contracts for safety.  He is medication and meal compliant. Patient's affect is appropriate to situation mild to moderate. Intervention  Staff conducting  1:1 interaction with patient to assess mood and thought process. Response  The patient Johanna Ni denies S/H ideations. He denies hearing voices.  Patient is attending assigned groups. Plan  Staff Plan is to continue to assess mood, assess thought process. Staff will continue to encourage verbalizing of feelings in appropriate manner.    To continue Q 15 minute checks for changes.

## 2018-06-14 NOTE — BH NOTES
GROUP THERAPY PROGRESS NOTE    The patient Marlon joe 52 y.o. male is participating in Coping Skills Group. Group time: 45 minutes    Personal goal for participation: To develop a personal plan for success    Goal orientation:  personal    Group therapy participation: active    Therapeutic interventions reviewed and discussed: positive coping strategies/goals    Impression of participation:  The patient was attentive.     Mimi Zaragoza  6/14/2018  1:25 PM

## 2018-06-14 NOTE — PROGRESS NOTES
Problem: Altered Thought Process (Adult/Pediatric)  Goal: *STG: Complies with medication therapy  Outcome: Progressing Towards Goal  Pt is alert and oriented to place but not situation. Pt has been pacing in frazier, talking loudly to himself about marijuana and the police. Pt follows direction but has poor boundaries and requires firm limts and direction. Pt given prn ativan. Pt compliant with meds and meals. Observe on q15' rounds. Assess mood and behavior. Assist to reality test. Encourage compliance with meds and groups.

## 2018-06-14 NOTE — PROGRESS NOTES
Problem: Altered Thought Process (Adult/Pediatric)  Goal: *STG: Participates in treatment plan  Outcome: Progressing Towards Goal  Pt rested quietly in bed with eyes closed. No signs/symptoms of distress, agitation, or anxiety. Will monitor for changes. Q 15 minute checks continue.  Pt slept 5.5hrs

## 2018-06-14 NOTE — BH NOTES
GROUP THERAPY PROGRESS NOTE    The patient Katiana Bauer is participating in [employer]. Group time: 30 minutes    Personal goal for participation: to orient the patient to the unit.     Goal orientation: successful adoption of unit rules    Group therapy participation: minimal    Therapeutic interventions reviewed and discussed: Yes    Impression of participation: minimal  Christa Davalos  6/14/2018 11:27 AM

## 2018-06-15 PROCEDURE — 74011250637 HC RX REV CODE- 250/637: Performed by: PSYCHIATRY & NEUROLOGY

## 2018-06-15 PROCEDURE — 65220000003 HC RM SEMIPRIVATE PSYCH

## 2018-06-15 RX ORDER — HALOPERIDOL 5 MG/ML
2 INJECTION INTRAMUSCULAR DAILY
Status: DISCONTINUED | OUTPATIENT
Start: 2018-06-15 | End: 2018-06-18

## 2018-06-15 RX ORDER — ARIPIPRAZOLE 5 MG/1
10 TABLET ORAL DAILY
Status: DISCONTINUED | OUTPATIENT
Start: 2018-06-15 | End: 2018-06-18

## 2018-06-15 RX ADMIN — DIVALPROEX SODIUM 1000 MG: 500 TABLET, FILM COATED, EXTENDED RELEASE ORAL at 21:29

## 2018-06-15 RX ADMIN — LORAZEPAM 1 MG: 1 TABLET ORAL at 19:43

## 2018-06-15 RX ADMIN — OLANZAPINE 5 MG: 5 TABLET, FILM COATED ORAL at 19:43

## 2018-06-15 RX ADMIN — ARIPIPRAZOLE 10 MG: 5 TABLET ORAL at 08:18

## 2018-06-15 NOTE — BH NOTES
GROUP THERAPY PROGRESS NOTE    The patient Asher Dean a 52 y.o. male is participating in [employer]. Group time: 1 hour    Personal goal for participation: To concentrate on selected task    Goal orientation: social    Group therapy participation: active    Therapeutic interventions reviewed and discussed: Crafts, games, music    Impression of participation: The patient was attentive.     Maria R Zaragoza  6/15/2018  5:02 PM

## 2018-06-15 NOTE — PROGRESS NOTES
Problem: Altered Thought Process (Adult/Pediatric)  Goal: *STG: Complies with medication therapy  Outcome: Progressing Towards Goal  Pt is alert but not oriented to situation. Pt is less restless and not talking to himself as much as yesterday. Pt is preoccupied and isolates to room. Pt has been compliant with meds. Observe on q15' rounds. Assess mood and behavior. Assist to reality test. Encourage compliance with meds and groups.

## 2018-06-15 NOTE — PROGRESS NOTES
Problem: Altered Thought Process (Adult/Pediatric)  Goal: *STG: Participates in treatment plan  Outcome: Progressing Towards Goal  Pt rested quietly in bed with eyes closed. No signs/symptoms of distress, agitation, or anxiety. Will monitor for changes. Q 15 minute checks continue.  PT slept 5 hrs

## 2018-06-15 NOTE — BH NOTES
GROUP THERAPY PROGRESS NOTE    The patient Johanna Ni a 52 y.o. male is participating in Coping Skills Group. Group time: 45 minutes    Personal goal for participation:  To identify people and things most grateful for    Goal orientation:  personal    Group therapy participation: minimal    Therapeutic interventions reviewed and discussed: benefits of gratitude    Impression of participation:  The patient was present-left early    Leo Rivas  6/15/2018  1:58 PM

## 2018-06-15 NOTE — BH NOTES
PSYCHIATRIC PROGRESS NOTE         Patient Name  Da Hazel   Date of Birth 1970   [facility] [id number]   Medical Record Number  [id number]      Age  52 y.o. PCP None   Admit date:  6/11/2018    Room Number  246/34  @ [address]   Date of Service  6/15/2018          PSYCHOTHERAPY SESSION NOTE:  Length of psychotherapy session: 15 minutes    Main condition/diagnosis/issues treated during session today, 6/15/2018 : mood swings, psychosis, medical and medication    I employed Cognitive Behavioral therapy techniques, Reality-Oriented psychotherapy, as well as supportive psychotherapy in regards to various ongoing psychosocial stressors, including the following: pre-admission and current problems; housing issues; occupational issues; academic issues; legal issues; medical issues; and stress of hospitalization. Interpersonal relationship issues and psychodynamic conflicts explored. Attempts made to alleviate maladaptive patterns. We, also, worked on issues of denial & effects of substance dependency/use     Overall, patient is not progressing    Treatment Plan Update (reviewed an updated 6/15/2018) : I will modify psychotherapy tx plan by implementing more stress management strategies, building upon cognitive behavioral techniques, increasing coping skills, as well as shoring up psychological defenses). An extended energy and skill set was needed to engage pt in psychotherapy due to some of the following: resistiveness, complexity, negativity, confrontational nature, hostile behaviors, and/or severe abnormalities in thought processes/psychosis resulting in the loss of expressive/receptive language communication skills. E & M PROGRESS NOTE:         HISTORY       CC:  \"don't need to be here\"  HISTORY OF PRESENT ILLNESS/INTERVAL HISTORY:  (reviewed/updated 6/15/2018). per initial evaluation: The patient, Da Hazel, is a 52 y.o.   [address] male with a past psychiatric history significant for psychosis and aggression , who presents at this time with complaints of (and/or evidence of) the following emotional symptoms: agitation, delusions, psychotic behavior, paranoid behavior and psychosis. Additional symptomatology include increased irritability and poor concentration. The above symptoms have been present for months. These symptoms are of moderate severity. These symptoms are constant in nature. The patient's condition has been precipitated by legal issues and psychosocial stressors (homelessness  ). He denied using drugs and he stated he is not to be here and he was bailed out of [place] and he was trespassing and he does not want to be here and he had medications side effects like gynecomastia       Maya Merrill presents/reports/evidences the following emotional symptoms today, 6/15/2018:agitation and psychotic behavior. The above symptoms have been present for months. These symptoms are of severe severity. The symptoms are constant in nature. Additional symptomatology and features include paranoid behavior, agitation, difficulty sleeping, increased irritability, poor concentration and problem with medication. Pt received prn med for agitation and combativeness. 6/14/18 he is compliant with medications and he is court order to do medications , he is paranoid and grandiose and he has no Si or HI and he sleeps better and eating well   6/15- preoccupied, paranoid and oppositional. Now accepting court order med but limited insight. Less irritable and slept 5hrs. Affect is sl better      SIDE EFFECTS: (reviewed/updated 6/15/2018)  None reported or admitted to. No noted toxicity with use of current med   ALLERGIES:(reviewed/updated 6/15/2018)  No Known Allergies   MEDICATIONS PRIOR TO ADMISSION:(reviewed/updated 6/15/2018)  No prescriptions prior to admission.       PAST MEDICAL HISTORY: Past medical history from the initial psychiatric evaluation has been reviewed (reviewed/updated 6/15/2018) with no additional updates (I asked patient and no additional past medical history provided). No past medical history on file. No past surgical history on file. SOCIAL HISTORY: Social history from the initial psychiatric evaluation has been reviewed (reviewed/updated 6/15/2018) with no additional updates (I asked patient and no additional social history provided). Social History     Social History    Marital status: SINGLE     Spouse name: N/A    Number of children: N/A    Years of education: N/A     Occupational History    Not on file. Social History Main Topics    Smoking status: Not on file    Smokeless tobacco: Not on file    Alcohol use Not on file    Drug use: Not on file    Sexual activity: Not on file     Other Topics Concern    Not on file     Social History Narrative      FAMILY HISTORY: Family history from the initial psychiatric evaluation has been reviewed (reviewed/updated 6/15/2018) with no additional updates (I asked patient and no additional family history provided). No family history on file. REVIEW OF SYSTEMS: (reviewed/updated 6/15/2018)  Appetite:no change from normal   Sleep: fitful   All other Review of Systems: Psychological ROS: positive for - behavioral disorder, concentration difficulties, hostility, mood swings, sleep disturbances and paranoid          [facility] (MSE):    MSE FINDINGS ARE WITHIN NORMAL LIMITS (WNL) UNLESS OTHERWISE STATED BELOW. ( ALL OF THE BELOW CATEGORIES OF THE MSE HAVE BEEN REVIEWED (reviewed 6/15/2018) AND UPDATED AS DEEMED APPROPRIATE )  General Presentation age appropriate and disheveled, evasive, uncooperative and unreliable   Orientation disorganized   Vital Signs  See below (reviewed 6/15/2018); Vital Signs (BP, Pulse, & Temp) are within normal limits if not listed below.    Gait and Station Stable/steady, no ataxia   Musculoskeletal System No extrapyramidal symptoms (EPS); no abnormal muscular movements or Tardive Dyskinesia (TD); muscle strength and tone are within normal limits   Language No aphasia or dysarthria   Speech:  hyperverbal and pressured   Thought Processes illogical; normal rate of thoughts; poor abstract reasoning/computation   Thought Associations circumstantial   Thought Content paranoid delusions, free of hallucinations and preoccupations   Suicidal Ideations none   Homicidal Ideations none   Mood:  hostile  and anxious    Affect:  hostile, inappropriate and increased in intensity   Memory recent  impaired   Memory remote:  intact   Concentration/Attention:  distractable   Fund of Knowledge average   Insight:  poor   Reliability poor   Judgment:  poor          VITALS:     Patient Vitals for the past 24 hrs:   Temp Pulse Resp BP   06/15/18 0600 98 °F (36.7 °C) 87 20 148/79   06/14/18 2044 98.5 °F (36.9 °C) 78 18 100/68   06/14/18 1200 98.1 °F (36.7 °C) 91 - 101/50     Wt Readings from Last 3 Encounters:   06/14/18 67.6 kg (149 lb)     Temp Readings from Last 3 Encounters:   06/15/18 98 °F (36.7 °C)     BP Readings from Last 3 Encounters:   06/15/18 148/79     Pulse Readings from Last 3 Encounters:   06/15/18 87            DATA     LABORATORY DATA:(reviewed/updated 6/15/2018)  No results found for this or any previous visit (from the past 24 hour(s)). No results found for: VALF2, VALAC, VALP, VALPR, DS6, CRBAM, CRBAMP, CARB2, XCRBAM  No results found for: LITHM   RADIOLOGY REPORTS:(reviewed/updated 6/15/2018)  No results found.        MEDICATIONS     ALL MEDICATIONS:   Current Facility-Administered Medications   Medication Dose Route Frequency    ARIPiprazole (ABILIFY) tablet 10 mg  10 mg Oral DAILY    Or    haloperidol lactate (HALDOL) injection 2 mg  2 mg IntraMUSCular DAILY    divalproex ER (DEPAKOTE ER) 24 hour tablet 1,000 mg  1,000 mg Oral QHS    Or    LORazepam (ATIVAN) injection 1 mg +++COURT ORDERED MEDICATION FOR REFUSAL OF PO DEPAKOTE+++  1 mg IntraMUSCular QHS    ziprasidone (GEODON) 20 mg in sterile water (preservative free) 1 mL injection  20 mg IntraMUSCular BID PRN    OLANZapine (ZyPREXA) tablet 5 mg  5 mg Oral Q6H PRN    benztropine (COGENTIN) tablet 2 mg  2 mg Oral BID PRN    benztropine (COGENTIN) injection 2 mg  2 mg IntraMUSCular BID PRN    LORazepam (ATIVAN) injection 2 mg  2 mg IntraMUSCular Q4H PRN    LORazepam (ATIVAN) tablet 1 mg  1 mg Oral Q4H PRN    zolpidem (AMBIEN) tablet 10 mg  10 mg Oral QHS PRN    acetaminophen (TYLENOL) tablet 650 mg  650 mg Oral Q4H PRN    ibuprofen (MOTRIN) tablet 400 mg  400 mg Oral Q8H PRN    magnesium hydroxide (MILK OF MAGNESIA) 400 mg/5 mL oral suspension 30 mL  30 mL Oral DAILY PRN    nicotine (NICODERM CQ) 21 mg/24 hr patch 1 Patch  1 Patch TransDERmal DAILY PRN      SCHEDULED MEDICATIONS:   Current Facility-Administered Medications   Medication Dose Route Frequency    ARIPiprazole (ABILIFY) tablet 10 mg  10 mg Oral DAILY    Or    haloperidol lactate (HALDOL) injection 2 mg  2 mg IntraMUSCular DAILY    divalproex ER (DEPAKOTE ER) 24 hour tablet 1,000 mg  1,000 mg Oral QHS    Or    LORazepam (ATIVAN) injection 1 mg +++COURT ORDERED MEDICATION FOR REFUSAL OF PO DEPAKOTE+++  1 mg IntraMUSCular QHS          ASSESSMENT & PLAN     DIAGNOSES REQUIRING ACTIVE TREATMENT AND MONITORING: (reviewed/updated 6/15/2018)  Patient Active Hospital Problem List:  Schizophrenia [facility]) (6/11/2018)    Assessment: Paranoid ideation and delusion , agitation. 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  Pt not interested in lab testing or tx    I will continue to monitor blood levels (Depakote, Tegretol, lithium, clozapine---a drug with a narrow therapeutic index= NTI) and associated labs for drug therapy implemented that require intense monitoring for toxicity as deemed appropriate based on current medication side effects and pharmacodynamically determined drug 1/2 lives. In summary, Yolanda Hernandez, is a 52 y.o.  male who presents with a severe exacerbation of the principal diagnosis of Schizophrenia ([facility] [address]  Patient's condition is worsening/not improving/not stable . Patient requires continued inpatient hospitalization for further stabilization, safety monitoring and medication management. I will continue to coordinate the provision of individual, milieu, occupational, group, and substance abuse therapies to address target symptoms/diagnoses as deemed appropriate for the individual patient. A coordinated, multidisplinary treatment team round was conducted with the patient (this team consists of the nurse, psychiatric unit pharmcist,  and writer). Complete current electronic health record for patient has been reviewed today including consultant notes, ancillary staff notes, nurses and psychiatric tech notes. Suicide risk assessment completed and patient deemed to be of low risk for suicide at this time. The following regarding medications was addressed during rounds with patient:   the risks and benefits of the proposed medication. The patient was given the opportunity to ask questions. Informed consent given to the use of the above medications. Will continue to adjust psychiatric and non-psychiatric medications (see above \"medication\" section and orders section for details) as deemed appropriate & based upon diagnoses and response to treatment. I will continue to order blood tests/labs and diagnostic tests as deemed appropriate and review results as they become available (see orders for details and above listed lab/test results).     I will order psychiatric records from previous [facility] hospitals to further elucidate the nature of patient's psychopathology and review once available. I will gather additional collateral information from friends, family and o/p treatment team to further elucidate the nature of patient's psychopathology and baselline level of psychiatric functioning. I certify that this patient's inpatient psychiatric hospital services furnished since the previous certification were, and continue to be, required for treatment that could reasonably be expected to improve the patient's condition, or for diagnostic study, and that the patient continues to need, on a daily basis, active treatment furnished directly by or requiring the supervision of inpatient psychiatric facility personnel. In addition, the hospital records show that services furnished were intensive treatment services, admission or related services, or equivalent services.     EXPECTED DISCHARGE DATE/DAY: TBD     DISPOSITION: Home       Signed By:   Salma Lee MD  6/15/2018

## 2018-06-15 NOTE — BH NOTES
Social Work     Pt was seen in treatment team this morning. Pt is alert and oriented. Pt denies SI/HI. Pt's mood is within normal limits, affect is flat. Pt's thought process is goal oriented and he asked to send his mother a letter. Pt's insight and judgment are improving, reliability is poor. Plan is to continue medication management and update pt's brother. Social work department will continue to coordinate discharge plans.

## 2018-06-15 NOTE — BH NOTES
Pt is visible on unit, alert and orientated, hygiene and nutrition is adequate, medication and meal compliant, patient is attending assigned groups, staff will continue Q 15 minute checks for changes.

## 2018-06-15 NOTE — BH NOTES
GROUP THERAPY PROGRESS NOTE    Melissa Morales is participating in [employer].      Group time: 30 minutes    Personal goal for participation:  Unit orientation    Goal orientation: Community    Group therapy participation: active    Therapeutic interventions reviewed and discussed: Yes    Impression of participation: good

## 2018-06-16 PROCEDURE — 65220000003 HC RM SEMIPRIVATE PSYCH

## 2018-06-16 PROCEDURE — 74011250637 HC RX REV CODE- 250/637: Performed by: PSYCHIATRY & NEUROLOGY

## 2018-06-16 RX ADMIN — DIVALPROEX SODIUM 1000 MG: 500 TABLET, FILM COATED, EXTENDED RELEASE ORAL at 21:44

## 2018-06-16 RX ADMIN — ARIPIPRAZOLE 10 MG: 5 TABLET ORAL at 08:04

## 2018-06-16 RX ADMIN — LORAZEPAM 1 MG: 1 TABLET ORAL at 08:04

## 2018-06-16 RX ADMIN — OLANZAPINE 5 MG: 5 TABLET, FILM COATED ORAL at 22:59

## 2018-06-16 RX ADMIN — ZOLPIDEM TARTRATE 10 MG: 10 TABLET ORAL at 22:59

## 2018-06-16 NOTE — BH NOTES
LEISURE GROUP THERAPY PROGRESS NOTE    The patient Asher Dean a 52 y.o. male is participating in Le[employer] Group. Group time: 45 minutes    Personal goal for participation: Daily social interactions with other peers & staff.     Goal orientation: Relaxation activities    Group therapy participation: minimal    Therapeutic interventions reviewed and discussed:  Yes    Impression of participation: kiel Mathis  6/16/2018  1:27 PM

## 2018-06-16 NOTE — BH NOTES
Pt was loud, oppositional with staff. Unable to follow staff directions. Zyprexa and Ativan PO given.

## 2018-06-16 NOTE — PROGRESS NOTES
Problem: Altered Thought Process (Adult/Pediatric)  Goal: *STG: Complies with medication therapy  Outcome: Progressing Towards Goal  Pt is compliant with medications and meals. Pt is focused on discharge. Pt denies hallucinations and s/h ideations. Pt denies any needs at this time. Will continue to monitor.

## 2018-06-16 NOTE — BH NOTES
PSYCHIATRIC PROGRESS NOTE         Patient Name  Morena Lambert   Date of Birth 1970   [facility] [id number]   Medical Record Number  [id number]      Age  52 y.o. PCP None   Admit date:  6/11/2018    Room Number  146/80  @ [facility]   Date of Service  6/16/2018          PSYCHOTHERAPY SESSION NOTE:  Length of psychotherapy session: 15 minutes    Main condition/diagnosis/issues treated during session today, 6/16/2018 : mood swings, psychosis, medical and medication    I employed Cognitive Behavioral therapy techniques, Reality-Oriented psychotherapy, as well as supportive psychotherapy in regards to various ongoing psychosocial stressors, including the following: pre-admission and current problems; housing issues; occupational issues; academic issues; legal issues; medical issues; and stress of hospitalization. Interpersonal relationship issues and psychodynamic conflicts explored. Attempts made to alleviate maladaptive patterns. We, also, worked on issues of denial & effects of substance dependency/use     Overall, patient is not progressing    Treatment Plan Update (reviewed an updated 6/16/2018) : I will modify psychotherapy tx plan by implementing more stress management strategies, building upon cognitive behavioral techniques, increasing coping skills, as well as shoring up psychological defenses). An extended energy and skill set was needed to engage pt in psychotherapy due to some of the following: resistiveness, complexity, negativity, confrontational nature, hostile behaviors, and/or severe abnormalities in thought processes/psychosis resulting in the loss of expressive/receptive language communication skills. E & M PROGRESS NOTE:         HISTORY       CC:  \"don't need to be here\"  HISTORY OF PRESENT ILLNESS/INTERVAL HISTORY:  (reviewed/updated 6/16/2018). per initial evaluation: The patient, Morena Lambert, is a 52 y.o.   Justin5 [place] male with a past psychiatric history significant for psychosis and aggression , who presents at this time with complaints of (and/or evidence of) the following emotional symptoms: agitation, delusions, psychotic behavior, paranoid behavior and psychosis. Additional symptomatology include increased irritability and poor concentration. The above symptoms have been present for months. These symptoms are of moderate severity. These symptoms are constant in nature. The patient's condition has been precipitated by legal issues and psychosocial stressors (homelessness  ). He denied using drugs and he stated he is not to be here and he was bailed out of [place] and he was trespassing and he does not want to be here and he had medications side effects like gynecomastia       [place]ia Min presents/reports/evidences the following emotional symptoms today, 6/16/2018:agitation and psychotic behavior. The above symptoms have been present for months. These symptoms are of severe severity. The symptoms are constant in nature. Additional symptomatology and features include paranoid behavior, agitation, difficulty sleeping, increased irritability, poor concentration and problem with medication. Pt received prn med for agitation and combativeness. 6/14/18 he is compliant with medications and he is court order to do medications , he is paranoid and grandiose and he has no Si or HI and he sleeps better and eating well   6/15- preoccupied, paranoid and oppositional. Now accepting court order med but limited insight. Less irritable and slept 5hrs. Affect is sl better  6/16-Remains guarded and pre-occupied. Still oppositional, received Zyprexa, Ativan x2- prn's. Tolerating increased dosage of Abilify. No SI/HI. SIDE EFFECTS: (reviewed/updated 6/16/2018)  None reported or admitted to.   No noted toxicity with use of current med   ALLERGIES:(reviewed/updated 6/16/2018)  No Known Allergies   MEDICATIONS PRIOR TO ADMISSION:(reviewed/updated 6/16/2018)  No prescriptions prior to admission. PAST MEDICAL HISTORY: Past medical history from the initial psychiatric evaluation has been reviewed (reviewed/updated 6/16/2018) with no additional updates (I asked patient and no additional past medical history provided). No past medical history on file. No past surgical history on file. SOCIAL HISTORY: Social history from the initial psychiatric evaluation has been reviewed (reviewed/updated 6/16/2018) with no additional updates (I asked patient and no additional social history provided). Social History     Social History    Marital status: SINGLE     Spouse name: N/A    Number of children: N/A    Years of education: N/A     Occupational History    Not on file. Social History Main Topics    Smoking status: Not on file    Smokeless tobacco: Not on file    Alcohol use Not on file    Drug use: Not on file    Sexual activity: Not on file     Other Topics Concern    Not on file     Social History Narrative      FAMILY HISTORY: Family history from the initial psychiatric evaluation has been reviewed (reviewed/updated 6/16/2018) with no additional updates (I asked patient and no additional family history provided). No family history on file.     REVIEW OF SYSTEMS: (reviewed/updated 6/16/2018)  Appetite:no change from normal   Sleep: fitful   All other Review of Systems: Psychological ROS: positive for - behavioral disorder, concentration difficulties, hostility, mood swings, sleep disturbances and paranoid          [facility] (MSE):    MSE FINDINGS ARE WITHIN NORMAL LIMITS (WNL) UNLESS OTHERWISE STATED BELOW. ( ALL OF THE BELOW CATEGORIES OF THE MSE HAVE BEEN REVIEWED (reviewed 6/16/2018) AND UPDATED AS DEEMED APPROPRIATE )  General Presentation age appropriate and disheveled, evasive, uncooperative and unreliable   Orientation disorganized   Vital Signs  See below (reviewed 6/16/2018); Vital Signs (BP, Pulse, & Temp) are within normal limits if not listed below. Gait and Station Stable/steady, no ataxia   Musculoskeletal System No extrapyramidal symptoms (EPS); no abnormal muscular movements or Tardive Dyskinesia (TD); muscle strength and tone are within normal limits   Language No aphasia or dysarthria   Speech:  hyperverbal and pressured   Thought Processes illogical; normal rate of thoughts; poor abstract reasoning/computation   Thought Associations circumstantial   Thought Content paranoid delusions, free of hallucinations and preoccupations   Suicidal Ideations none   Homicidal Ideations none   Mood:  hostile  and anxious    Affect:  hostile, inappropriate and increased in intensity   Memory recent  impaired   Memory remote:  intact   Concentration/Attention:  distractable   Fund of Knowledge average   Insight:  poor   Reliability poor   Judgment:  poor          VITALS:     Patient Vitals for the past 24 hrs:   Temp Pulse Resp BP   06/16/18 1543 - 80 16 120/69   06/16/18 0550 95.8 °F (35.4 °C) 79 16 (!) 83/61     Wt Readings from Last 3 Encounters:   06/14/18 67.6 kg (149 lb)     Temp Readings from Last 3 Encounters:   06/16/18 95.8 °F (35.4 °C)     BP Readings from Last 3 Encounters:   06/16/18 120/69     Pulse Readings from Last 3 Encounters:   06/16/18 80            DATA     LABORATORY DATA:(reviewed/updated 6/16/2018)  No results found for this or any previous visit (from the past 24 hour(s)). No results found for: VALF2, VALAC, VALP, VALPR, DS6, CRBAM, CRBAMP, CARB2, XCRBAM  No results found for: LITHM   RADIOLOGY REPORTS:(reviewed/updated 6/16/2018)  No results found.        MEDICATIONS     ALL MEDICATIONS:   Current Facility-Administered Medications   Medication Dose Route Frequency    ARIPiprazole (ABILIFY) tablet 10 mg  10 mg Oral DAILY    Or    haloperidol lactate (HALDOL) injection 2 mg  2 mg IntraMUSCular DAILY    divalproex ER (DEPAKOTE ER) 24 hour tablet 1,000 mg  1,000 mg Oral QHS    Or    LORazepam (ATIVAN) injection 1 mg +++COURT ORDERED MEDICATION FOR REFUSAL OF PO DEPAKOTE+++  1 mg IntraMUSCular QHS    ziprasidone (GEODON) 20 mg in sterile water (preservative free) 1 mL injection  20 mg IntraMUSCular BID PRN    OLANZapine (ZyPREXA) tablet 5 mg  5 mg Oral Q6H PRN    benztropine (COGENTIN) tablet 2 mg  2 mg Oral BID PRN    benztropine (COGENTIN) injection 2 mg  2 mg IntraMUSCular BID PRN    LORazepam (ATIVAN) injection 2 mg  2 mg IntraMUSCular Q4H PRN    LORazepam (ATIVAN) tablet 1 mg  1 mg Oral Q4H PRN    zolpidem (AMBIEN) tablet 10 mg  10 mg Oral QHS PRN    acetaminophen (TYLENOL) tablet 650 mg  650 mg Oral Q4H PRN    ibuprofen (MOTRIN) tablet 400 mg  400 mg Oral Q8H PRN    magnesium hydroxide (MILK OF MAGNESIA) 400 mg/5 mL oral suspension 30 mL  30 mL Oral DAILY PRN    nicotine (NICODERM CQ) 21 mg/24 hr patch 1 Patch  1 Patch TransDERmal DAILY PRN      SCHEDULED MEDICATIONS:   Current Facility-Administered Medications   Medication Dose Route Frequency    ARIPiprazole (ABILIFY) tablet 10 mg  10 mg Oral DAILY    Or    haloperidol lactate (HALDOL) injection 2 mg  2 mg IntraMUSCular DAILY    divalproex ER (DEPAKOTE ER) 24 hour tablet 1,000 mg  1,000 mg Oral QHS    Or    LORazepam (ATIVAN) injection 1 mg +++COURT ORDERED MEDICATION FOR REFUSAL OF PO DEPAKOTE+++  1 mg IntraMUSCular QHS          ASSESSMENT & PLAN     DIAGNOSES REQUIRING ACTIVE TREATMENT AND MONITORING: (reviewed/updated 6/16/2018)  Patient Active Hospital Problem List:  Schizophrenia [facility]) (6/11/2018)    Assessment: Paranoid ideation and delusion , agitation. Plan: Will ct to adjust medication- Depakote 500 mg po bid , change to Abilify ( gynecomastia per hx)  Request court order med due to non compliance  6/14/18 continue same medications   6/15- will titrate Abilify.     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 Pt not interested in lab testing or tx    I will continue to monitor blood levels (Depakote, Tegretol, lithium, clozapine---a drug with a narrow therapeutic index= NTI) and associated labs for drug therapy implemented that require intense monitoring for toxicity as deemed appropriate based on current medication side effects and pharmacodynamically determined drug 1/2 lives. In summary, Melissa Morales, is a 52 y.o.  male who presents with a severe exacerbation of the principal diagnosis of Schizophrenia ([facility] [address]  Patient's condition is worsening/not improving/not stable . Patient requires continued inpatient hospitalization for further stabilization, safety monitoring and medication management. I will continue to coordinate the provision of individual, milieu, occupational, group, and substance abuse therapies to address target symptoms/diagnoses as deemed appropriate for the individual patient. A coordinated, multidisplinary treatment team round was conducted with the patient (this team consists of the nurse, psychiatric unit pharmcist,  and writer). Complete current electronic health record for patient has been reviewed today including consultant notes, ancillary staff notes, nurses and psychiatric tech notes. Suicide risk assessment completed and patient deemed to be of low risk for suicide at this time. The following regarding medications was addressed during rounds with patient:   the risks and benefits of the proposed medication. The patient was given the opportunity to ask questions. Informed consent given to the use of the above medications. Will continue to adjust psychiatric and non-psychiatric medications (see above \"medication\" section and orders section for details) as deemed appropriate & based upon diagnoses and response to treatment.      I will continue to order blood tests/labs and diagnostic tests as deemed appropriate and review results as they become available (see orders for details and above listed lab/test results). I will order psychiatric records from previous [facility] hospitals to further elucidate the nature of patient's psychopathology and review once available. I will gather additional collateral information from friends, family and o/p treatment team to further elucidate the nature of patient's psychopathology and baselline level of psychiatric functioning. I certify that this patient's inpatient psychiatric hospital services furnished since the previous certification were, and continue to be, required for treatment that could reasonably be expected to improve the patient's condition, or for diagnostic study, and that the patient continues to need, on a daily basis, active treatment furnished directly by or requiring the supervision of inpatient psychiatric facility personnel. In addition, the hospital records show that services furnished were intensive treatment services, admission or related services, or equivalent services.     EXPECTED DISCHARGE DATE/DAY: TBD     DISPOSITION: Home       Signed By:   Aquilino Marques MD  6/16/2018

## 2018-06-17 PROCEDURE — 65220000003 HC RM SEMIPRIVATE PSYCH

## 2018-06-17 PROCEDURE — 74011250637 HC RX REV CODE- 250/637: Performed by: PSYCHIATRY & NEUROLOGY

## 2018-06-17 RX ADMIN — ARIPIPRAZOLE 10 MG: 5 TABLET ORAL at 09:17

## 2018-06-17 RX ADMIN — DIVALPROEX SODIUM 1000 MG: 500 TABLET, FILM COATED, EXTENDED RELEASE ORAL at 21:39

## 2018-06-17 NOTE — BH NOTES
GROUP THERAPY PROGRESS NOTE    The patient Taylor Welch is participating in [employer]. Group time: 30 minutes    Personal goal for participation: to orient the patient to the unit.     Goal orientation: successful adoption of unit rules    Group therapy participation: active    Therapeutic interventions reviewed and discussed: Yes    Impression of participation:     [facility]  6/17/2018 8:31 AM

## 2018-06-17 NOTE — BH NOTES
At 2245 hrs patient was loud, irritable, accused staff of keeping him illegal and demanding to let him go. Requested to sleep in seclusion room. Zyprexa and Ambien given and pt slept for 6 hours in open seclusion room. Q 15 minutes monitoring continued for the safety.

## 2018-06-17 NOTE — BH NOTES
PSYCHIATRIC PROGRESS NOTE         Patient Name  Keagan Hubbard   Date of Birth 1970   [facility] [id number]   Medical Record Number  [id number]      Age  52 y.o. PCP None   Admit date:  6/11/2018    Room Number  136/68  @ [address]   Date of Service  6/17/2018          PSYCHOTHERAPY SESSION NOTE:  Length of psychotherapy session: 15 minutes    Main condition/diagnosis/issues treated during session today, 6/17/2018 : mood swings, psychosis, medical and medication    I employed Cognitive Behavioral therapy techniques, Reality-Oriented psychotherapy, as well as supportive psychotherapy in regards to various ongoing psychosocial stressors, including the following: pre-admission and current problems; housing issues; occupational issues; academic issues; legal issues; medical issues; and stress of hospitalization. Interpersonal relationship issues and psychodynamic conflicts explored. Attempts made to alleviate maladaptive patterns. We, also, worked on issues of denial & effects of substance dependency/use     Overall, patient is not progressing    Treatment Plan Update (reviewed an updated 6/17/2018) : I will modify psychotherapy tx plan by implementing more stress management strategies, building upon cognitive behavioral techniques, increasing coping skills, as well as shoring up psychological defenses). An extended energy and skill set was needed to engage pt in psychotherapy due to some of the following: resistiveness, complexity, negativity, confrontational nature, hostile behaviors, and/or severe abnormalities in thought processes/psychosis resulting in the loss of expressive/receptive language communication skills. E & M PROGRESS NOTE:         HISTORY       CC:  \"don't need to be here\"  HISTORY OF PRESENT ILLNESS/INTERVAL HISTORY:  (reviewed/updated 6/17/2018). per initial evaluation: The patient, Keagan Hubbard, is a 52 y.o.   [address] male with a past psychiatric history significant for psychosis and aggression , who presents at this time with complaints of (and/or evidence of) the following emotional symptoms: agitation, delusions, psychotic behavior, paranoid behavior and psychosis. Additional symptomatology include increased irritability and poor concentration. The above symptoms have been present for months. These symptoms are of moderate severity. These symptoms are constant in nature. The patient's condition has been precipitated by legal issues and psychosocial stressors (homelessness  ). He denied using drugs and he stated he is not to be here and he was bailed out of [place] and he was trespassing and he does not want to be here and he had medications side effects like gynecomastia       Sol Daquan presents/reports/evidences the following emotional symptoms today, 6/17/2018:agitation and psychotic behavior. The above symptoms have been present for months. These symptoms are of severe severity. The symptoms are constant in nature. Additional symptomatology and features include paranoid behavior, agitation, difficulty sleeping, increased irritability, poor concentration and problem with medication. Pt received prn med for agitation and combativeness. 6/14/18 he is compliant with medications and he is court order to do medications , he is paranoid and grandiose and he has no Si or HI and he sleeps better and eating well   6/15- preoccupied, paranoid and oppositional. Now accepting court order med but limited insight. Less irritable and slept 5hrs. Affect is sl better  6/16-Remains guarded and pre-occupied. Still oppositional, received Zyprexa, Ativan x2- prn's. Tolerating increased dosage of Abilify. No SI/HI. 6/17- Improved thought organization but insight is poor. Does not believe if he needs meds or hospitalization. Slept in open seclusion and used Ambien and Vistaril-prn's.       SIDE EFFECTS: (reviewed/updated 6/17/2018)  None reported or admitted to. No noted toxicity with use of current med   ALLERGIES:(reviewed/updated 6/17/2018)  No Known Allergies   MEDICATIONS PRIOR TO ADMISSION:(reviewed/updated 6/17/2018)  No prescriptions prior to admission. PAST MEDICAL HISTORY: Past medical history from the initial psychiatric evaluation has been reviewed (reviewed/updated 6/17/2018) with no additional updates (I asked patient and no additional past medical history provided). No past medical history on file. No past surgical history on file. SOCIAL HISTORY: Social history from the initial psychiatric evaluation has been reviewed (reviewed/updated 6/17/2018) with no additional updates (I asked patient and no additional social history provided). Social History     Social History    Marital status: SINGLE     Spouse name: N/A    Number of children: N/A    Years of education: N/A     Occupational History    Not on file. Social History Main Topics    Smoking status: Not on file    Smokeless tobacco: Not on file    Alcohol use Not on file    Drug use: Not on file    Sexual activity: Not on file     Other Topics Concern    Not on file     Social History Narrative      FAMILY HISTORY: Family history from the initial psychiatric evaluation has been reviewed (reviewed/updated 6/17/2018) with no additional updates (I asked patient and no additional family history provided). No family history on file.     REVIEW OF SYSTEMS: (reviewed/updated 6/17/2018)  Appetite:no change from normal   Sleep: fitful   All other Review of Systems: Psychological ROS: positive for - behavioral disorder, concentration difficulties, hostility, mood swings, sleep disturbances and paranoid          MENTAL STATUS EXAM & VITALS     MENTAL STATUS EXAM (MSE):    MSE FINDINGS ARE WITHIN NORMAL LIMITS (WNL) UNLESS OTHERWISE STATED BELOW. ( ALL OF THE BELOW CATEGORIES OF THE MSE HAVE BEEN REVIEWED (reviewed 6/17/2018) AND UPDATED AS DEEMED APPROPRIATE )  General Presentation age appropriate and disheveled, evasive, uncooperative and unreliable   Orientation disorganized   Vital Signs  See below (reviewed 6/17/2018); Vital Signs (BP, Pulse, & Temp) are within normal limits if not listed below. Gait and Station Stable/steady, no ataxia   Musculoskeletal System No extrapyramidal symptoms (EPS); no abnormal muscular movements or Tardive Dyskinesia (TD); muscle strength and tone are within normal limits   Language No aphasia or dysarthria   Speech:  hyperverbal and pressured   Thought Processes illogical; normal rate of thoughts; poor abstract reasoning/computation   Thought Associations circumstantial   Thought Content paranoid delusions, free of hallucinations and preoccupations   Suicidal Ideations none   Homicidal Ideations none   Mood:  hostile  and anxious    Affect:  hostile, inappropriate and increased in intensity   Memory recent  impaired   Memory remote:  intact   Concentration/Attention:  distractable   Fund of Knowledge average   Insight:  poor   Reliability poor   Judgment:  poor          VITALS:     Patient Vitals for the past 24 hrs:   Temp Pulse Resp BP SpO2   06/17/18 0943 97.6 °F (36.4 °C) 97 20 118/60 98 %     Wt Readings from Last 3 Encounters:   06/14/18 67.6 kg (149 lb)     Temp Readings from Last 3 Encounters:   06/17/18 97.6 °F (36.4 °C)     BP Readings from Last 3 Encounters:   06/17/18 118/60     Pulse Readings from Last 3 Encounters:   06/17/18 97            DATA     LABORATORY DATA:(reviewed/updated 6/17/2018)  No results found for this or any previous visit (from the past 24 hour(s)). No results found for: VALF2, VALAC, VALP, VALPR, DS6, CRBAM, CRBAMP, CARB2, XCRBAM  No results found for: LITHM   RADIOLOGY REPORTS:(reviewed/updated 6/17/2018)  No results found.        MEDICATIONS     ALL MEDICATIONS:   Current Facility-Administered Medications   Medication Dose Route Frequency    ARIPiprazole (ABILIFY) tablet 10 mg  10 mg Oral DAILY    Or    haloperidol lactate (HALDOL) injection 2 mg  2 mg IntraMUSCular DAILY    divalproex ER (DEPAKOTE ER) 24 hour tablet 1,000 mg  1,000 mg Oral QHS    Or    LORazepam (ATIVAN) injection 1 mg +++COURT ORDERED MEDICATION FOR REFUSAL OF PO DEPAKOTE+++  1 mg IntraMUSCular QHS    ziprasidone (GEODON) 20 mg in sterile water (preservative free) 1 mL injection  20 mg IntraMUSCular BID PRN    OLANZapine (ZyPREXA) tablet 5 mg  5 mg Oral Q6H PRN    benztropine (COGENTIN) tablet 2 mg  2 mg Oral BID PRN    benztropine (COGENTIN) injection 2 mg  2 mg IntraMUSCular BID PRN    LORazepam (ATIVAN) injection 2 mg  2 mg IntraMUSCular Q4H PRN    LORazepam (ATIVAN) tablet 1 mg  1 mg Oral Q4H PRN    zolpidem (AMBIEN) tablet 10 mg  10 mg Oral QHS PRN    acetaminophen (TYLENOL) tablet 650 mg  650 mg Oral Q4H PRN    ibuprofen (MOTRIN) tablet 400 mg  400 mg Oral Q8H PRN    magnesium hydroxide (MILK OF MAGNESIA) 400 mg/5 mL oral suspension 30 mL  30 mL Oral DAILY PRN    nicotine (NICODERM CQ) 21 mg/24 hr patch 1 Patch  1 Patch TransDERmal DAILY PRN      SCHEDULED MEDICATIONS:   Current Facility-Administered Medications   Medication Dose Route Frequency    ARIPiprazole (ABILIFY) tablet 10 mg  10 mg Oral DAILY    Or    haloperidol lactate (HALDOL) injection 2 mg  2 mg IntraMUSCular DAILY    divalproex ER (DEPAKOTE ER) 24 hour tablet 1,000 mg  1,000 mg Oral QHS    Or    LORazepam (ATIVAN) injection 1 mg +++COURT ORDERED MEDICATION FOR REFUSAL OF PO DEPAKOTE+++  1 mg IntraMUSCular QHS          ASSESSMENT & PLAN     DIAGNOSES REQUIRING ACTIVE TREATMENT AND MONITORING: (reviewed/updated 6/17/2018)  Patient Active Hospital Problem List:  Schizophrenia [facility]) (6/11/2018)    Assessment: Paranoid ideation and delusion , agitation. 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 Pt not interested in lab testing or tx    I will continue to monitor blood levels (Depakote, Tegretol, lithium, clozapine---a drug with a narrow therapeutic index= NTI) and associated labs for drug therapy implemented that require intense monitoring for toxicity as deemed appropriate based on current medication side effects and pharmacodynamically determined drug 1/2 lives. In summary, Marlon Parker, is a 52 y.o.  male who presents with a severe exacerbation of the principal diagnosis of Schizophrenia ([facility] [place] [address]  Patient's condition is worsening/not improving/not stable . Patient requires continued inpatient hospitalization for further stabilization, safety monitoring and medication management. I will continue to coordinate the provision of individual, milieu, occupational, group, and substance abuse therapies to address target symptoms/diagnoses as deemed appropriate for the individual patient. A coordinated, multidisplinary treatment team round was conducted with the patient (this team consists of the nurse, psychiatric unit pharmcist,  and writer). Complete current electronic health record for patient has been reviewed today including consultant notes, ancillary staff notes, nurses and psychiatric tech notes. Suicide risk assessment completed and patient deemed to be of low risk for suicide at this time. The following regarding medications was addressed during rounds with patient:   the risks and benefits of the proposed medication. The patient was given the opportunity to ask questions. Informed consent given to the use of the above medications.  Will continue to adjust psychiatric and non-psychiatric medications (see above \"medication\" section and orders section for details) as deemed appropriate & based upon diagnoses and response to treatment. I will continue to order blood tests/labs and diagnostic tests as deemed appropriate and review results as they become available (see orders for details and above listed lab/test results). I will order psychiatric records from previous [facility] hospitals to further elucidate the nature of patient's psychopathology and review once available. I will gather additional collateral information from friends, family and o/p treatment team to further elucidate the nature of patient's psychopathology and baselline level of psychiatric functioning. I certify that this patient's inpatient psychiatric hospital services furnished since the previous certification were, and continue to be, required for treatment that could reasonably be expected to improve the patient's condition, or for diagnostic study, and that the patient continues to need, on a daily basis, active treatment furnished directly by or requiring the supervision of inpatient psychiatric facility personnel. In addition, the hospital records show that services furnished were intensive treatment services, admission or related services, or equivalent services.     EXPECTED DISCHARGE DATE/DAY: TBD     DISPOSITION: Home       Signed By:   Chris Mccarty MD  6/17/2018

## 2018-06-17 NOTE — BH NOTES
Pt medication and meals compliant Pt alert on the unit get along with others Pt behavorial wasn't no problem Pt will remain on Q 15 checks for safety.

## 2018-06-17 NOTE — PROGRESS NOTES
Problem: Altered Thought Process (Adult/Pediatric)  Goal: *STG: Complies with medication therapy  Outcome: Progressing Towards Goal  Pt is alert. Pt is compliant with medications but states that he feels he does not need medications. Observed pt pacing the hallway responding to internal stimuli. Pt denies hallucinations and denies suicidal and homicidal ideations. Pt denies any needs at this time. Will continue to monitor q15 minutes.

## 2018-06-18 LAB — VALPROATE SERPL-MCNC: 65 UG/ML (ref 50–100)

## 2018-06-18 PROCEDURE — 36415 COLL VENOUS BLD VENIPUNCTURE: CPT | Performed by: PSYCHIATRY & NEUROLOGY

## 2018-06-18 PROCEDURE — 74011250637 HC RX REV CODE- 250/637: Performed by: PSYCHIATRY & NEUROLOGY

## 2018-06-18 PROCEDURE — 80164 ASSAY DIPROPYLACETIC ACD TOT: CPT | Performed by: PSYCHIATRY & NEUROLOGY

## 2018-06-18 PROCEDURE — 65220000001 HC RM PRIVATE PSYCH

## 2018-06-18 RX ORDER — LORAZEPAM 2 MG/ML
1 INJECTION INTRAMUSCULAR
Status: DISCONTINUED | OUTPATIENT
Start: 2018-06-18 | End: 2018-06-22 | Stop reason: HOSPADM

## 2018-06-18 RX ORDER — HALOPERIDOL 5 MG/ML
2 INJECTION INTRAMUSCULAR DAILY
Status: DISCONTINUED | OUTPATIENT
Start: 2018-06-19 | End: 2018-06-20

## 2018-06-18 RX ORDER — ARIPIPRAZOLE 5 MG/1
5 TABLET ORAL ONCE
Status: DISPENSED | OUTPATIENT
Start: 2018-06-18 | End: 2018-06-18

## 2018-06-18 RX ORDER — DIVALPROEX SODIUM 500 MG/1
1500 TABLET, EXTENDED RELEASE ORAL
Status: DISCONTINUED | OUTPATIENT
Start: 2018-06-18 | End: 2018-06-22 | Stop reason: HOSPADM

## 2018-06-18 RX ADMIN — ARIPIPRAZOLE 10 MG: 5 TABLET ORAL at 07:43

## 2018-06-18 RX ADMIN — DIVALPROEX SODIUM 1500 MG: 500 TABLET, FILM COATED, EXTENDED RELEASE ORAL at 21:55

## 2018-06-18 NOTE — BH NOTES
Social Work     Pt was seen in treatment team this morning. Pt is alert and oriented. Pt denies SI/HI. Pt's mood is angry, affect is constricted. Pt hyper focused on TDO process that led pt to [facility]. Writer will update pt's brother and again request the mental health information he mentioned that he would fax last week. Pt refusing to sign CRI for pt's brother to fax hospital records. Writer will make attempt in am to get CRI again. Pt's insight and judgment are poor, reliability is poor. Pt's brother, [employer] updated on pt. Social work department will continue to coordinate discharge plans.

## 2018-06-18 NOTE — PROGRESS NOTES
Problem: Altered Thought Process (Adult/Pediatric)  Goal: *STG: Participates in treatment plan  Outcome: Progressing Towards Goal  Patient is irritable. Loud. Paranoid. Continues to feel like he does not need medications. Abilify was increased today. Requires redirection. Visible on the unit. Will continue to monitor patient and assess needs.

## 2018-06-18 NOTE — BH NOTES
GROUP THERAPY PROGRESS NOTE    The patient Lolis joe 52 y.o. male is participating in Coping Skills Group. Group time: 45 minutes    Personal goal for participation:  To participate in self esteem booster    Goal orientation:  personal    Group therapy participation: active and disruptive    Therapeutic interventions reviewed and discussed: self nurturing activities    Impression of participation:  The patient was present-frequently veers off topic with angry rants requiring redirection    Ashwin Calle  6/18/2018  2:22 PM

## 2018-06-18 NOTE — BH NOTES
PSYCHIATRIC PROGRESS NOTE         Patient Name  Jud Aggarwal   Date of Birth 1970   [facility] [id number]   Medical Record Number  [id number]      Age  52 y.o. PCP None   Admit date:  6/11/2018    Room Number  785/76  @ [facility]   Date of Service  6/18/2018          PSYCHOTHERAPY SESSION NOTE:  Length of psychotherapy session: 15 minutes    Main condition/diagnosis/issues treated during session today, 6/18/2018 : mood swings, psychosis, medical and medication    I employed Cognitive Behavioral therapy techniques, Reality-Oriented psychotherapy, as well as supportive psychotherapy in regards to various ongoing psychosocial stressors, including the following: pre-admission and current problems; housing issues; occupational issues; academic issues; legal issues; medical issues; and stress of hospitalization. Interpersonal relationship issues and psychodynamic conflicts explored. Attempts made to alleviate maladaptive patterns. We, also, worked on issues of denial & effects of substance dependency/use     Overall, patient is not progressing    Treatment Plan Update (reviewed an updated 6/18/2018) : I will modify psychotherapy tx plan by implementing more stress management strategies, building upon cognitive behavioral techniques, increasing coping skills, as well as shoring up psychological defenses). An extended energy and skill set was needed to engage pt in psychotherapy due to some of the following: resistiveness, complexity, negativity, confrontational nature, hostile behaviors, and/or severe abnormalities in thought processes/psychosis resulting in the loss of expressive/receptive language communication skills. E & M PROGRESS NOTE:         HISTORY       CC:  \"don't need to be here\"  HISTORY OF PRESENT ILLNESS/INTERVAL HISTORY:  (reviewed/updated 6/18/2018). per initial evaluation: The patient, Jud Aggarwal, is a 52 y.o.   Justin5 Corey [place] male with a past psychiatric history significant for psychosis and aggression , who presents at this time with complaints of (and/or evidence of) the following emotional symptoms: agitation, delusions, psychotic behavior, paranoid behavior and psychosis. Additional symptomatology include increased irritability and poor concentration. The above symptoms have been present for months. These symptoms are of moderate severity. These symptoms are constant in nature. The patient's condition has been precipitated by legal issues and psychosocial stressors (homelessness  ). He denied using drugs and he stated he is not to be here and he was bailed out of [place] and he was trespassing and he does not want to be here and he had medications side effects like gynecomastia       Efrem Garibay presents/reports/evidences the following emotional symptoms today, 6/18/2018:agitation and psychotic behavior. The above symptoms have been present for months. These symptoms are of severe severity. The symptoms are constant in nature. Additional symptomatology and features include paranoid behavior, agitation, difficulty sleeping, increased irritability, poor concentration and problem with medication. Pt received prn med for agitation and combativeness. 6/14/18 he is compliant with medications and he is court order to do medications , he is paranoid and grandiose and he has no Si or HI and he sleeps better and eating well   6/15- preoccupied, paranoid and oppositional. Now accepting court order med but limited insight. Less irritable and slept 5hrs. Affect is sl better  6/16-Remains guarded and pre-occupied. Still oppositional, received Zyprexa, Ativan x2- prn's. Tolerating increased dosage of Abilify. No SI/HI. 6/17- Improved thought organization but insight is poor. Does not believe if he needs meds or hospitalization. Slept in open seclusion and used Ambien and Vistaril-prn's. 6/18- irritable. Guarded and paranoid.  Threatening staff and tx team, preoccupied with being \"bonded out\" pressured and labile. Making sexually inappropriate comment to n/staff. Accepting med but no insight. Slept in open seclusion due to causing disturbance to his roommate/ no aggression      SIDE EFFECTS: (reviewed/updated 6/18/2018)  None reported or admitted to. No noted toxicity with use of current med   ALLERGIES:(reviewed/updated 6/18/2018)  No Known Allergies   MEDICATIONS PRIOR TO ADMISSION:(reviewed/updated 6/18/2018)  No prescriptions prior to admission. PAST MEDICAL HISTORY: Past medical history from the initial psychiatric evaluation has been reviewed (reviewed/updated 6/18/2018) with no additional updates (I asked patient and no additional past medical history provided). No past medical history on file. No past surgical history on file. SOCIAL HISTORY: Social history from the initial psychiatric evaluation has been reviewed (reviewed/updated 6/18/2018) with no additional updates (I asked patient and no additional social history provided). Social History     Social History    Marital status: SINGLE     Spouse name: N/A    Number of children: N/A    Years of education: N/A     Occupational History    Not on file. Social History Main Topics    Smoking status: Not on file    Smokeless tobacco: Not on file    Alcohol use Not on file    Drug use: Not on file    Sexual activity: Not on file     Other Topics Concern    Not on file     Social History Narrative      FAMILY HISTORY: Family history from the initial psychiatric evaluation has been reviewed (reviewed/updated 6/18/2018) with no additional updates (I asked patient and no additional family history provided). No family history on file.     REVIEW OF SYSTEMS: (reviewed/updated 6/18/2018)  Appetite:no change from normal   Sleep: fitful   All other Review of Systems: Psychological ROS: positive for - behavioral disorder, concentration difficulties, hostility, mood swings, sleep disturbances and paranoid          MENTAL STATUS EXAM & VITALS     MENTAL STATUS EXAM (MSE):    MSE FINDINGS ARE WITHIN NORMAL LIMITS (WNL) UNLESS OTHERWISE STATED BELOW. ( ALL OF THE BELOW CATEGORIES OF THE MSE HAVE BEEN REVIEWED (reviewed 6/18/2018) AND UPDATED AS DEEMED APPROPRIATE )  General Presentation age appropriate and disheveled, evasive, uncooperative and unreliable   Orientation disorganized   Vital Signs  See below (reviewed 6/18/2018); Vital Signs (BP, Pulse, & Temp) are within normal limits if not listed below.    Gait and Station Stable/steady, no ataxia   Musculoskeletal System No extrapyramidal symptoms (EPS); no abnormal muscular movements or Tardive Dyskinesia (TD); muscle strength and tone are within normal limits   Language No aphasia or dysarthria   Speech:  hyperverbal and pressured   Thought Processes illogical; normal rate of thoughts; poor abstract reasoning/computation   Thought Associations circumstantial   Thought Content paranoid delusions, free of hallucinations and preoccupations   Suicidal Ideations none   Homicidal Ideations none   Mood:  hostile  and anxious    Affect:  hostile, inappropriate and increased in intensity   Memory recent  impaired   Memory remote:  intact   Concentration/Attention:  distractable   Fund of Knowledge average   Insight:  poor   Reliability poor   Judgment:  poor          VITALS:     Patient Vitals for the past 24 hrs:   Temp Pulse Resp BP SpO2   06/18/18 0553 - 79 18 108/66 -   06/17/18 0943 97.6 °F (36.4 °C) 97 20 118/60 98 %     Wt Readings from Last 3 Encounters:   06/14/18 67.6 kg (149 lb)     Temp Readings from Last 3 Encounters:   No data found for Temp     BP Readings from Last 3 Encounters:   06/18/18 108/66     Pulse Readings from Last 3 Encounters:   06/18/18 79            DATA     LABORATORY DATA:(reviewed/updated 6/18/2018)  Recent Results (from the past 24 hour(s))   VALPROIC ACID    Collection Time: 06/18/18  4:55 AM   Result Value Ref Range    Valproic acid 65 50 - 100 ug/ml     Lab Results   Component Value Date/Time    Valproic acid 65 06/18/2018 04:55 AM     No results found for: LITHM   RADIOLOGY REPORTS:(reviewed/updated 6/18/2018)  No results found.        MEDICATIONS     ALL MEDICATIONS:   Current Facility-Administered Medications   Medication Dose Route Frequency    [START ON 6/19/2018] ARIPiprazole (ABILIFY) tablet 20 mg  20 mg Oral DAILY    Or    [START ON 6/19/2018] haloperidol lactate (HALDOL) injection 2 mg  2 mg IntraMUSCular DAILY    ARIPiprazole (ABILIFY) tablet 5 mg  5 mg Oral ONCE    divalproex ER (DEPAKOTE ER) 24 hour tablet 1,500 mg  1,500 mg Oral QHS    Or    LORazepam (ATIVAN) injection 1 mg  1 mg IntraMUSCular QHS    ziprasidone (GEODON) 20 mg in sterile water (preservative free) 1 mL injection  20 mg IntraMUSCular BID PRN    OLANZapine (ZyPREXA) tablet 5 mg  5 mg Oral Q6H PRN    benztropine (COGENTIN) tablet 2 mg  2 mg Oral BID PRN    benztropine (COGENTIN) injection 2 mg  2 mg IntraMUSCular BID PRN    LORazepam (ATIVAN) injection 2 mg  2 mg IntraMUSCular Q4H PRN    LORazepam (ATIVAN) tablet 1 mg  1 mg Oral Q4H PRN    zolpidem (AMBIEN) tablet 10 mg  10 mg Oral QHS PRN    acetaminophen (TYLENOL) tablet 650 mg  650 mg Oral Q4H PRN    ibuprofen (MOTRIN) tablet 400 mg  400 mg Oral Q8H PRN    magnesium hydroxide (MILK OF MAGNESIA) 400 mg/5 mL oral suspension 30 mL  30 mL Oral DAILY PRN    nicotine (NICODERM CQ) 21 mg/24 hr patch 1 Patch  1 Patch TransDERmal DAILY PRN      SCHEDULED MEDICATIONS:   Current Facility-Administered Medications   Medication Dose Route Frequency    [START ON 6/19/2018] ARIPiprazole (ABILIFY) tablet 20 mg  20 mg Oral DAILY    Or    [START ON 6/19/2018] haloperidol lactate (HALDOL) injection 2 mg  2 mg IntraMUSCular DAILY    ARIPiprazole (ABILIFY) tablet 5 mg  5 mg Oral ONCE    divalproex ER (DEPAKOTE ER) 24 hour tablet 1,500 mg  1,500 mg Oral QHS    Or    LORazepam (ATIVAN) injection 1 mg  1 mg IntraMUSCular QHS          ASSESSMENT & PLAN     DIAGNOSES REQUIRING ACTIVE TREATMENT AND MONITORING: (reviewed/updated 6/18/2018)  Patient Active Hospital Problem List:  Schizophrenia [facility]) (6/11/2018)    Assessment: Paranoid ideation , irritable, poor insight    Plan: Will ct to adjust medication- Depakote, Abilify and titrate ( gynecomastia per hx)        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 Pt not interested in lab testing or tx    I will continue to monitor blood levels (Depakote, ---a drug with a narrow therapeutic index= NTI) and associated labs for drug therapy implemented that require intense monitoring for toxicity as deemed appropriate based on current medication side effects and pharmacodynamically determined drug 1/2 lives. - 65- subtherapeutic    In summary, Morena Lambert, is a 52 y.o.  male who presents with a severe exacerbation of the principal diagnosis of Schizophrenia ([facility] [place] [address]  Patient's condition is worsening/not improving/not stable . Patient requires continued inpatient hospitalization for further stabilization, safety monitoring and medication management. I will continue to coordinate the provision of individual, milieu, occupational, group, and substance abuse therapies to address target symptoms/diagnoses as deemed appropriate for the individual patient. A coordinated, multidisplinary treatment team round was conducted with the patient (this team consists of the nurse, psychiatric unit pharmcist,  and writer). Complete current electronic health record for patient has been reviewed today including consultant notes, ancillary staff notes, nurses and psychiatric tech notes. Suicide risk assessment completed and patient deemed to be of low risk for suicide at this time.      The following regarding medications was addressed during rounds with patient:   the risks and benefits of the proposed medication. The patient was given the opportunity to ask questions. Informed consent given to the use of the above medications. Will continue to adjust psychiatric and non-psychiatric medications (see above \"medication\" section and orders section for details) as deemed appropriate & based upon diagnoses and response to treatment. I will continue to order blood tests/labs and diagnostic tests as deemed appropriate and review results as they become available (see orders for details and above listed lab/test results). I will order psychiatric records from previous [facility] hospitals to further elucidate the nature of patient's psychopathology and review once available. I will gather additional collateral information from friends, family and o/p treatment team to further elucidate the nature of patient's psychopathology and baselline level of psychiatric functioning. I certify that this patient's inpatient psychiatric hospital services furnished since the previous certification were, and continue to be, required for treatment that could reasonably be expected to improve the patient's condition, or for diagnostic study, and that the patient continues to need, on a daily basis, active treatment furnished directly by or requiring the supervision of inpatient psychiatric facility personnel. In addition, the hospital records show that services furnished were intensive treatment services, admission or related services, or equivalent services.     EXPECTED DISCHARGE DATE/DAY: TBD     DISPOSITION: Home       Signed By:   Aracelis Suresh MD  6/18/2018

## 2018-06-18 NOTE — BH NOTES
GROUP THERAPY PROGRESS NOTE    The patient Cheryl joe 52 y.o. male is participating in [employer]. Group time: 1 hour    Personal goal for participation: To concentrate on selected task    Goal orientation: social    Group therapy participation: active    Therapeutic interventions reviewed and discussed: Crafts, games, music    Impression of participation: The patient was attentive.     Norberto Valladares  6/18/2018  5:39 PM

## 2018-06-18 NOTE — PROGRESS NOTES
Laboratory Monitoring for Valproic Acid    This patient is currently prescribed the following medication(s):   Current Facility-Administered Medications   Medication Dose Route Frequency    [START ON 6/19/2018] ARIPiprazole (ABILIFY) tablet 20 mg  20 mg Oral DAILY    Or    [START ON 6/19/2018] haloperidol lactate (HALDOL) injection 2 mg+++COURT ORDERED MEDICATION FOR REFUSAL OF PO ABILIFY+++   2 mg IntraMUSCular DAILY    ARIPiprazole (ABILIFY) tablet 5 mg  5 mg Oral ONCE    divalproex ER (DEPAKOTE ER) 24 hour tablet 1,500 mg  1,500 mg Oral QHS    Or    LORazepam (ATIVAN) injection 1 mg +++COURT ORDERED MEDICATION FOR REFUSAL OF PO DEPAKOTE+++  1 mg IntraMUSCular QHS       The following labs have been completed for monitoring of valproic acid:    Valproic Acid Serum Concentration  Lab Results   Component Value Date/Time    Valproic acid 65 06/18/2018 04:55 AM             Assessment/Plan:  Level of 65 mcg/mL drawn at steady state on 6/18 @ 0455 (last dose was @ 2139). Level is on lower end of therapeutic range, dose increased to 1500 mg PO QHS.  LFTs & CBC OK     Thank you,    Shruthi Mesa, PHARMD, BCPS  Contact: [phone]

## 2018-06-18 NOTE — PROGRESS NOTES
Problem: Altered Thought Process (Adult/Pediatric)  Goal: *STG: Participates in treatment plan  Outcome: Progressing Towards Goal  Pt slept 3 hours. Pt requested to sleep in open seclusion room so that he can be \"ok. \" He is still paranoid and delusional. He wrote a sexually innapropriate note to a staff member. Respirations were unlabored. Continuing to monitor with q15 min rounds for safety.

## 2018-06-19 PROCEDURE — 65220000001 HC RM PRIVATE PSYCH

## 2018-06-19 PROCEDURE — 74011250637 HC RX REV CODE- 250/637: Performed by: PSYCHIATRY & NEUROLOGY

## 2018-06-19 RX ADMIN — ARIPIPRAZOLE 20 MG: 15 TABLET ORAL at 08:16

## 2018-06-19 RX ADMIN — DIVALPROEX SODIUM 1500 MG: 500 TABLET, FILM COATED, EXTENDED RELEASE ORAL at 21:55

## 2018-06-19 NOTE — PROGRESS NOTES
Problem: Altered Thought Process (Adult/Pediatric)  Goal: *STG: Participates in treatment plan  Outcome: Progressing Towards Goal  Patient is irritable. Disorganized paranoid. Patient phone called was ordered to be monitored. Patient continues to state he does not need medications. Requires redirection. Will continue to monitor patient and assess needs.

## 2018-06-19 NOTE — BH NOTES
GROUP THERAPY PROGRESS NOTE    The patient Irina joe 52 y.o. male is participating in Goals Group.     Group time: 30 minutes    Personal goal for participation: Daily goal setting    Goal orientation: personal    Group therapy participation: active    Therapeutic interventions reviewed and discussed:  Yes    Impression of participation:     Federica Swenson  6/18/2018  9:40 PM

## 2018-06-19 NOTE — BH NOTES
Patient was brought to unit by security in wheelchair. Patient was able to transfer to chair without assistance. Patient stated that the doctors at the other hospital were fighting. She stated that her  brought her to the hospital because her meds weren't right. She said she was taking to much medication. Patient was searched with another nurse. Patient had no contraband. Patient had no lacerations, bruises, or tattoos. Patient's skin was darkened on upper inner thighs and under her breast. Patient had dry, hardened skin on top of right foot. Patient had to puncture marks on either hands. Patient is labile; affect bright, mood labile. Patient ranged from being pleasant, smiling to being a little irritable. Patient received breakfast and was oriented to unit. Clothes were placed in the wash because they were malodorous.

## 2018-06-19 NOTE — BH NOTES
Nurse received a call from [facility]. Patient is calling elsie collado threatening to kill people. Patient is threatening to kill the Governor and his Doctor's. Patient denies this. Patient is placed on monitored phone calls. MD was notified. Will continue to monitor patient for safety and assess needs.

## 2018-06-19 NOTE — PROGRESS NOTES
Problem: Altered Thought Process (Adult/Pediatric)  Goal: *STG: Participates in treatment plan  Outcome: Progressing Towards Goal  Pt did not sleep this shift. He was up talking in his room. He refused PO PRN medications. He remained in his room and was responsive to redirections. Staff will continue to monitor for health and safety.

## 2018-06-19 NOTE — PROGRESS NOTES
Problem: Altered Thought Process (Adult/Pediatric)  Goal: *STG: Complies with medication therapy  Outcome: Progressing Towards Goal  Pt is compliant with medications and meals. Pt is responding to internal stimuli, loudly at times. Pt requires redirection for being loud and disruptive. PT does not appear to be in any distress. Will continue to monitor pt.

## 2018-06-19 NOTE — BH NOTES
PSYCHIATRIC PROGRESS NOTE         Patient Name  Efrem Garibay   Date of Birth 1970   [facility] [id number]   Medical Record Number  [id number]      Age  52 y.o. PCP None   Admit date:  6/11/2018    Room Number  307/01  @ [facility]   Date of Service  6/19/2018          PSYCHOTHERAPY SESSION NOTE:  Length of psychotherapy session: 15 minutes    Main condition/diagnosis/issues treated during session today, 6/19/2018 : mood swings, psychosis, medical and medication    I employed Cognitive Behavioral therapy techniques, Reality-Oriented psychotherapy, as well as supportive psychotherapy in regards to various ongoing psychosocial stressors, including the following: pre-admission and current problems; housing issues; occupational issues; academic issues; legal issues; medical issues; and stress of hospitalization. Interpersonal relationship issues and psychodynamic conflicts explored. Attempts made to alleviate maladaptive patterns. We, also, worked on issues of denial & effects of substance dependency/use     Overall, patient is not progressing    Treatment Plan Update (reviewed an updated 6/19/2018) : I will modify psychotherapy tx plan by implementing more stress management strategies, building upon cognitive behavioral techniques, increasing coping skills, as well as shoring up psychological defenses). An extended energy and skill set was needed to engage pt in psychotherapy due to some of the following: resistiveness, complexity, negativity, confrontational nature, hostile behaviors, and/or severe abnormalities in thought processes/psychosis resulting in the loss of expressive/receptive language communication skills. E & M PROGRESS NOTE:         HISTORY       CC:  \"don't need to be here\"  HISTORY OF PRESENT ILLNESS/INTERVAL HISTORY:  (reviewed/updated 6/19/2018). per initial evaluation: The patient, Efrem Garibay, is a 52 y.o.   [address] male with a past psychiatric history significant for psychosis and aggression , who presents at this time with complaints of (and/or evidence of) the following emotional symptoms: agitation, delusions, psychotic behavior, paranoid behavior and psychosis. Additional symptomatology include increased irritability and poor concentration. The above symptoms have been present for months. These symptoms are of moderate severity. These symptoms are constant in nature. The patient's condition has been precipitated by legal issues and psychosocial stressors (homelessness  ). He denied using drugs and he stated he is not to be here and he was bailed out of [place] and he was trespassing and he does not want to be here and he had medications side effects like gynecomastia       Jud Aggarwal presents/reports/evidences the following emotional symptoms today, 6/19/2018:agitation and psychotic behavior. The above symptoms have been present for months. These symptoms are of severe severity. The symptoms are constant in nature. Additional symptomatology and features include paranoid behavior, agitation, difficulty sleeping, increased irritability, poor concentration and problem with medication. Pt received prn med for agitation and combativeness. 6/14/18 he is compliant with medications and he is court order to do medications , he is paranoid and grandiose and he has no Si or HI and he sleeps better and eating well   6/15- preoccupied, paranoid and oppositional. Now accepting court order med but limited insight. Less irritable and slept 5hrs. Affect is sl better  6/16-Remains guarded and pre-occupied. Still oppositional, received Zyprexa, Ativan x2- prn's. Tolerating increased dosage of Abilify. No SI/HI. 6/17- Improved thought organization but insight is poor. Does not believe if he needs meds or hospitalization. Slept in open seclusion and used Ambien and Vistaril-prn's. 6/18- irritable. Guarded and paranoid.  Threatening staff and tx team, preoccupied with being \"bonded out\" pressured and labile. Making sexually inappropriate comment to n/staff. Accepting med but no insight. Slept in open seclusion due to causing disturbance to his roommate/ no aggression  6/19- paranoid delusion, calling [employer] threatening to kill people. Poor insight and only taking the court order medication      SIDE EFFECTS: (reviewed/updated 6/19/2018)  None reported or admitted to. No noted toxicity with use of current med   ALLERGIES:(reviewed/updated 6/19/2018)  No Known Allergies   MEDICATIONS PRIOR TO ADMISSION:(reviewed/updated 6/19/2018)  No prescriptions prior to admission. PAST MEDICAL HISTORY: Past medical history from the initial psychiatric evaluation has been reviewed (reviewed/updated 6/19/2018) with no additional updates (I asked patient and no additional past medical history provided). No past medical history on file. No past surgical history on file. SOCIAL HISTORY: Social history from the initial psychiatric evaluation has been reviewed (reviewed/updated 6/19/2018) with no additional updates (I asked patient and no additional social history provided). Social History     Social History    Marital status: SINGLE     Spouse name: N/A    Number of children: N/A    Years of education: N/A     Occupational History    Not on file. Social History Main Topics    Smoking status: Not on file    Smokeless tobacco: Not on file    Alcohol use Not on file    Drug use: Not on file    Sexual activity: Not on file     Other Topics Concern    Not on file     Social History Narrative      FAMILY HISTORY: Family history from the initial psychiatric evaluation has been reviewed (reviewed/updated 6/19/2018) with no additional updates (I asked patient and no additional family history provided). No family history on file.     REVIEW OF SYSTEMS: (reviewed/updated 6/19/2018)  Appetite:no change from normal   Sleep: fitful   All other Review of Systems: Psychological ROS: positive for - behavioral disorder, concentration difficulties, hostility, mood swings, sleep disturbances and paranoid          [facility] (MSE):    MSE FINDINGS ARE WITHIN NORMAL LIMITS (WNL) UNLESS OTHERWISE STATED BELOW. ( ALL OF THE BELOW CATEGORIES OF THE MSE HAVE BEEN REVIEWED (reviewed 6/19/2018) AND UPDATED AS DEEMED APPROPRIATE )  General Presentation age appropriate and disheveled, evasive, uncooperative and unreliable   Orientation disorganized   Vital Signs  See below (reviewed 6/19/2018); Vital Signs (BP, Pulse, & Temp) are within normal limits if not listed below.    Gait and Station Stable/steady, no ataxia   Musculoskeletal System No extrapyramidal symptoms (EPS); no abnormal muscular movements or Tardive Dyskinesia (TD); muscle strength and tone are within normal limits   Language No aphasia or dysarthria   Speech:  hyperverbal and pressured   Thought Processes illogical; normal rate of thoughts; poor abstract reasoning/computation   Thought Associations circumstantial   Thought Content paranoid delusions, free of hallucinations and preoccupations   Suicidal Ideations none   Homicidal Ideations none   Mood:  hostile  and anxious    Affect:  hostile, inappropriate and increased in intensity   Memory recent  impaired   Memory remote:  intact   Concentration/Attention:  distractable   Fund of Knowledge average   Insight:  poor   Reliability poor   Judgment:  poor          VITALS:     Patient Vitals for the past 24 hrs:   Temp Pulse Resp BP   06/19/18 0907 98 °F (36.7 °C) 88 - 115/64   06/19/18 0524 - 80 18 104/71     Wt Readings from Last 3 Encounters:   06/14/18 67.6 kg (149 lb)     Temp Readings from Last 3 Encounters:   06/19/18 98 °F (36.7 °C)     BP Readings from Last 3 Encounters:   06/19/18 115/64     Pulse Readings from Last 3 Encounters:   06/19/18 88            DATA     LABORATORY DATA:(reviewed/updated 6/19/2018)  No results found for this or any previous visit (from the past 24 hour(s)). Lab Results   Component Value Date/Time    Valproic acid 65 06/18/2018 04:55 AM     No results found for: RICHARD   RADIOLOGY REPORTS:(reviewed/updated 6/19/2018)  No results found.        MEDICATIONS     ALL MEDICATIONS:   Current Facility-Administered Medications   Medication Dose Route Frequency    ARIPiprazole (ABILIFY) tablet 20 mg  20 mg Oral DAILY    Or    haloperidol lactate (HALDOL) injection 2 mg+++COURT ORDERED MEDICATION FOR REFUSAL OF PO ABILIFY+++   2 mg IntraMUSCular DAILY    divalproex ER (DEPAKOTE ER) 24 hour tablet 1,500 mg  1,500 mg Oral QHS    Or    LORazepam (ATIVAN) injection 1 mg +++COURT ORDERED MEDICATION FOR REFUSAL OF PO DEPAKOTE+++  1 mg IntraMUSCular QHS    ziprasidone (GEODON) 20 mg in sterile water (preservative free) 1 mL injection  20 mg IntraMUSCular BID PRN    OLANZapine (ZyPREXA) tablet 5 mg  5 mg Oral Q6H PRN    benztropine (COGENTIN) tablet 2 mg  2 mg Oral BID PRN    benztropine (COGENTIN) injection 2 mg  2 mg IntraMUSCular BID PRN    LORazepam (ATIVAN) injection 2 mg  2 mg IntraMUSCular Q4H PRN    LORazepam (ATIVAN) tablet 1 mg  1 mg Oral Q4H PRN    zolpidem (AMBIEN) tablet 10 mg  10 mg Oral QHS PRN    acetaminophen (TYLENOL) tablet 650 mg  650 mg Oral Q4H PRN    ibuprofen (MOTRIN) tablet 400 mg  400 mg Oral Q8H PRN    magnesium hydroxide (MILK OF MAGNESIA) 400 mg/5 mL oral suspension 30 mL  30 mL Oral DAILY PRN    nicotine (NICODERM CQ) 21 mg/24 hr patch 1 Patch  1 Patch TransDERmal DAILY PRN      SCHEDULED MEDICATIONS:   Current Facility-Administered Medications   Medication Dose Route Frequency    ARIPiprazole (ABILIFY) tablet 20 mg  20 mg Oral DAILY    Or    haloperidol lactate (HALDOL) injection 2 mg+++COURT ORDERED MEDICATION FOR REFUSAL OF PO ABILIFY+++   2 mg IntraMUSCular DAILY    divalproex ER (DEPAKOTE ER) 24 hour tablet 1,500 mg  1,500 mg Oral QHS    Or    LORazepam (ATIVAN) injection 1 mg +++COURT ORDERED MEDICATION FOR REFUSAL OF PO DEPAKOTE+++  1 mg IntraMUSCular QHS          ASSESSMENT & PLAN     DIAGNOSES REQUIRING ACTIVE TREATMENT AND MONITORING: (reviewed/updated 6/19/2018)  Patient Active Hospital Problem List:  Schizophrenia [facility]) (6/11/2018)    Assessment: Paranoid ideation , irritable, poor insight. Phone restriction due to calling retirement and threatening behavior. 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 Pt not interested in lab testing or tx    I will continue to monitor blood levels (Depakote, ---a drug with a narrow therapeutic index= NTI) and associated labs for drug therapy implemented that require intense monitoring for toxicity as deemed appropriate based on current medication side effects and pharmacodynamically determined drug 1/2 lives. - 65- subtherapeutic    In summary, Lexi Cowan, is a 52 y.o.  male who presents with a severe exacerbation of the principal diagnosis of Schizophrenia ([facility] [address]  Patient's condition is worsening/not improving/not stable . Patient requires continued inpatient hospitalization for further stabilization, safety monitoring and medication management. I will continue to coordinate the provision of individual, milieu, occupational, group, and substance abuse therapies to address target symptoms/diagnoses as deemed appropriate for the individual patient. A coordinated, multidisplinary treatment team round was conducted with the patient (this team consists of the nurse, psychiatric unit pharmcist,  and writer). Complete current electronic health record for patient has been reviewed today including consultant notes, ancillary staff notes, nurses and psychiatric tech notes.     Suicide risk assessment completed and patient deemed to be of low risk for suicide at this time. The following regarding medications was addressed during rounds with patient:   the risks and benefits of the proposed medication. The patient was given the opportunity to ask questions. Informed consent given to the use of the above medications. Will continue to adjust psychiatric and non-psychiatric medications (see above \"medication\" section and orders section for details) as deemed appropriate & based upon diagnoses and response to treatment. I will continue to order blood tests/labs and diagnostic tests as deemed appropriate and review results as they become available (see orders for details and above listed lab/test results). I will order psychiatric records from previous [facility] hospitals to further elucidate the nature of patient's psychopathology and review once available. I will gather additional collateral information from friends, family and o/p treatment team to further elucidate the nature of patient's psychopathology and baselline level of psychiatric functioning. I certify that this patient's inpatient psychiatric hospital services furnished since the previous certification were, and continue to be, required for treatment that could reasonably be expected to improve the patient's condition, or for diagnostic study, and that the patient continues to need, on a daily basis, active treatment furnished directly by or requiring the supervision of inpatient psychiatric facility personnel. In addition, the hospital records show that services furnished were intensive treatment services, admission or related services, or equivalent services.     EXPECTED DISCHARGE DATE/DAY: TBD     DISPOSITION: Home       Signed By:   Wen Harvey MD  6/19/2018

## 2018-06-19 NOTE — BH NOTES
GROUP THERAPY PROGRESS NOTE    The patient Aurea Coughlin a 52 y.o. male is participating in Coping Skills Group. Group time: 45 minutes    Personal goal for participation: To participate in happiness game    Goal orientation:  personal    Group therapy participation: active    Therapeutic interventions reviewed and discussed: life scenarios exploring the pursuit of happiness    Impression of participation:  The patient was attentive.     Azam Zaragoza  6/19/2018  1:05 PM

## 2018-06-19 NOTE — PROGRESS NOTES
Diet as tolerated. Pt noted to be meal compliant. Hx unremarkable per nutrition.   Ht: 5'2\"   Wt: 149 lb  BMI: 27.25 kg/(m^2) c/w overweight  Est energy needs: 1665 kcal, 64 g protein, 1 mL/kcal fluids  Pt will consume > 75% of meals at follow up

## 2018-06-19 NOTE — BH NOTES
GROUP THERAPY PROGRESS NOTE    The patient Marlon joe 52 y.o. male is participating in Creative Expression Group. Group time: 1 hour    Personal goal for participation: To concentrate on selected task    Goal orientation: social    Group therapy participation: active    Therapeutic interventions reviewed and discussed: Crafts, games, music    Impression of participation: The patient was attentive.     Mimi Zaragoza  6/19/2018  5:14 PM

## 2018-06-20 PROCEDURE — 74011250637 HC RX REV CODE- 250/637: Performed by: PSYCHIATRY & NEUROLOGY

## 2018-06-20 PROCEDURE — 65220000001 HC RM PRIVATE PSYCH

## 2018-06-20 RX ORDER — HALOPERIDOL 5 MG/ML
2 INJECTION INTRAMUSCULAR DAILY
Status: DISCONTINUED | OUTPATIENT
Start: 2018-06-21 | End: 2018-06-22 | Stop reason: HOSPADM

## 2018-06-20 RX ORDER — ARIPIPRAZOLE 15 MG/1
30 TABLET ORAL DAILY
Status: DISCONTINUED | OUTPATIENT
Start: 2018-06-21 | End: 2018-06-22 | Stop reason: HOSPADM

## 2018-06-20 RX ADMIN — ARIPIPRAZOLE 20 MG: 15 TABLET ORAL at 08:25

## 2018-06-20 RX ADMIN — DIVALPROEX SODIUM 1500 MG: 500 TABLET, FILM COATED, EXTENDED RELEASE ORAL at 21:20

## 2018-06-20 NOTE — PROGRESS NOTES
Mr. Federica Medina actively participated in Spirituality Group about Spiritual Gifts on [facility] 22 unit     Arie Santos M.Div.    Paging Service [phone])

## 2018-06-20 NOTE — PROGRESS NOTES
Problem: Altered Thought Process (Adult/Pediatric)  Goal: *STG: Complies with medication therapy  Outcome: Progressing Towards Goal  Pt slept 3 hours. Pt had uneventful night and required no PRN's. Pt had no complaints or signs of distress throughout night. Respirations were unlabored. Continuing to monitor with q15 min rounds for safety.

## 2018-06-20 NOTE — PROGRESS NOTES
Patient alert and oriented. Calm and cooperative with staff. Isolative and keeps to self while in dayroom. Talks to himself and appears to have paranoid delusions. Patient denies thoughts of harming self or others. Denies auditory, visual and tactile hallucinations. Patient is compliant with meals and medications. Will continue to monitor and assess.

## 2018-06-20 NOTE — BH NOTES
SOCIAL WORK    Writer faxed ([phone]) pt's brother, Venkat Palacios a release so he could send pt's medical records via fax. Social Work Department will continue supporting pt towards discharge goals. Addendum:  Per Venkat Palacios he requested fax was not received and should be sent to Irene Payne at [phone].

## 2018-06-20 NOTE — PROGRESS NOTES
Problem: Altered Thought Process (Adult/Pediatric)  Goal: *STG: Complies with medication therapy  Outcome: Progressing Towards Goal  Pt is alert and oriented but not to situation, pt is loose and illogical. Pt talks to himself and paces but is able to spend time in dayroom watching tv. Pt has been compliant with meds and meals. Denies si/hi and contracts for safety. Pt is disorganized and disheveled. Observe on q15' rounds. Assess mood and behavior. Assist to reality test. Encourage compliance with meds and groups.

## 2018-06-20 NOTE — PROGRESS NOTES
Problem: Altered Thought Process (Adult/Pediatric)  Goal: *STG: Complies with medication therapy  Outcome: Progressing Towards Goal  Pt is alert/oriented x4. Calm and Cooperative. Appropriate and visible in the milieu. Attending groups. Mood is good. Meds/meal compliant. No PRNs given this shift. No behavioral issues. Denies suicidal and homicidal ideations. Denies auditory and visual hallucinations. Will continue to monitor pt with q15 min rounds for safety.

## 2018-06-20 NOTE — BH NOTES
GROUP THERAPY PROGRESS NOTE    The patient Maya Merrill a 52 y.o. male is participating in Coping Skills Group. Group time: 45 minutes    Personal goal for participation: To participate in relaxation activity    Goal orientation:  relaxation    Group therapy participation: active    Therapeutic interventions reviewed and discussed: favorite ways to relax    Impression of participation:  The patient was attentive.     Vika Cifuentes  6/20/2018  5:13 PM

## 2018-06-21 PROCEDURE — 74011250637 HC RX REV CODE- 250/637: Performed by: PSYCHIATRY & NEUROLOGY

## 2018-06-21 PROCEDURE — 65220000001 HC RM PRIVATE PSYCH

## 2018-06-21 RX ADMIN — ARIPIPRAZOLE 30 MG: 15 TABLET ORAL at 09:02

## 2018-06-21 RX ADMIN — DIVALPROEX SODIUM 1500 MG: 500 TABLET, FILM COATED, EXTENDED RELEASE ORAL at 21:20

## 2018-06-21 NOTE — BH NOTES
GROUP THERAPY PROGRESS NOTE    Marlon Parker is participating in [place].      Group time: 30 minutes    Personal goal for participation: reality orientation    Goal orientation: community    Group therapy participation: disruptive    Therapeutic interventions reviewed and discussed: yes    Impression of participation: POOR

## 2018-06-21 NOTE — BH NOTES
Patient has been cooperative this shift. Participating appropriately in group. Eating well. Reports that he only slept 2 hours last night. Patient has been compliant with medications. No complaints at this time. Patient will be monitored for safety per unit policy.

## 2018-06-21 NOTE — BH NOTES
PSYCHIATRIC PROGRESS NOTE         Patient Name  Yolanda Hernandez   Date of Birth 1970   [facility] [id number]   Medical Record Number  [id number]      Age  52 y.o. PCP None   Admit date:  6/11/2018    Room Number  307/01  @ [address]   Date of Service  6/20/2018          PSYCHOTHERAPY SESSION NOTE:  Length of psychotherapy session: 15 minutes    Main condition/diagnosis/issues treated during session today, 6/20/2018 : mood swings, psychosis, medical and medication    I employed Cognitive Behavioral therapy techniques, Reality-Oriented psychotherapy, as well as supportive psychotherapy in regards to various ongoing psychosocial stressors, including the following: pre-admission and current problems; housing issues; occupational issues; academic issues; legal issues; medical issues; and stress of hospitalization. Interpersonal relationship issues and psychodynamic conflicts explored. Attempts made to alleviate maladaptive patterns. We, also, worked on issues of denial & effects of substance dependency/use     Overall, patient is not progressing    Treatment Plan Update (reviewed an updated 6/20/2018) : I will modify psychotherapy tx plan by implementing more stress management strategies, building upon cognitive behavioral techniques, increasing coping skills, as well as shoring up psychological defenses). An extended energy and skill set was needed to engage pt in psychotherapy due to some of the following: resistiveness, complexity, negativity, confrontational nature, hostile behaviors, and/or severe abnormalities in thought processes/psychosis resulting in the loss of expressive/receptive language communication skills. E & M PROGRESS NOTE:         HISTORY       CC:  \"don't need to be here\"  HISTORY OF PRESENT ILLNESS/INTERVAL HISTORY:  (reviewed/updated 6/20/2018). per initial evaluation: The patient, Yolanda Hernandez, is a 52 y.o.   [address] male with a past psychiatric history significant for psychosis and aggression , who presents at this time with complaints of (and/or evidence of) the following emotional symptoms: agitation, delusions, psychotic behavior, paranoid behavior and psychosis. Additional symptomatology include increased irritability and poor concentration. The above symptoms have been present for months. These symptoms are of moderate severity. These symptoms are constant in nature. The patient's condition has been precipitated by legal issues and psychosocial stressors (homelessness  ). He denied using drugs and he stated he is not to be here and he was bailed out of [place] and he was trespassing and he does not want to be here and he had medications side effects like gynecomastia       Laron James presents/reports/evidences the following emotional symptoms today, 6/20/2018:agitation and psychotic behavior. The above symptoms have been present for months. These symptoms are of severe severity. The symptoms are constant in nature. Additional symptomatology and features include paranoid behavior, agitation, difficulty sleeping, increased irritability, poor concentration and problem with medication. Pt received prn med for agitation and combativeness. 6/14/18 he is compliant with medications and he is court order to do medications , he is paranoid and grandiose and he has no Si or HI and he sleeps better and eating well   6/15- preoccupied, paranoid and oppositional. Now accepting court order med but limited insight. Less irritable and slept 5hrs. Affect is sl better  6/16-Remains guarded and pre-occupied. Still oppositional, received Zyprexa, Ativan x2- prn's. Tolerating increased dosage of Abilify. No SI/HI. 6/17- Improved thought organization but insight is poor. Does not believe if he needs meds or hospitalization. Slept in open seclusion and used Ambien and Vistaril-prn's. 6/18- irritable. Guarded and paranoid.  Threatening staff and tx team, preoccupied with being \"bonded out\" pressured and labile. Making sexually inappropriate comment to n/staff. Accepting med but no insight. Slept in open seclusion due to causing disturbance to his roommate/ no aggression  6/19- paranoid delusion, calling [place] [facility] threatening to kill people. Poor insight and only taking the court order medication  6/20- remains preoccupied with delusional themes, irritable and denies calling the long-term threatening people but admits to calling them. poor insight      SIDE EFFECTS: (reviewed/updated 6/20/2018)  None reported or admitted to. No noted toxicity with use of current med   ALLERGIES:(reviewed/updated 6/20/2018)  No Known Allergies   MEDICATIONS PRIOR TO ADMISSION:(reviewed/updated 6/20/2018)  No prescriptions prior to admission. PAST MEDICAL HISTORY: Past medical history from the initial psychiatric evaluation has been reviewed (reviewed/updated 6/20/2018) with no additional updates (I asked patient and no additional past medical history provided). No past medical history on file. No past surgical history on file. SOCIAL HISTORY: Social history from the initial psychiatric evaluation has been reviewed (reviewed/updated 6/20/2018) with no additional updates (I asked patient and no additional social history provided). Social History     Social History    Marital status: SINGLE     Spouse name: N/A    Number of children: N/A    Years of education: N/A     Occupational History    Not on file.      Social History Main Topics    Smoking status: Not on file    Smokeless tobacco: Not on file    Alcohol use Not on file    Drug use: Not on file    Sexual activity: Not on file     Other Topics Concern    Not on file     Social History Narrative      FAMILY HISTORY: Family history from the initial psychiatric evaluation has been reviewed (reviewed/updated 6/20/2018) with no additional updates (I asked patient and no additional family history provided). No family history on file. REVIEW OF SYSTEMS: (reviewed/updated 6/20/2018)  Appetite:no change from normal   Sleep: fitful   All other Review of Systems: Psychological ROS: positive for - behavioral disorder, concentration difficulties, hostility, mood swings, sleep disturbances and paranoid          [facility] ([facility]):    MSE FINDINGS ARE WITHIN NORMAL LIMITS (WNL) UNLESS OTHERWISE STATED BELOW. ( ALL OF THE BELOW CATEGORIES OF THE MSE HAVE BEEN REVIEWED (reviewed 6/20/2018) AND UPDATED AS DEEMED APPROPRIATE )  General Presentation age appropriate and disheveled, evasive, uncooperative and unreliable   Orientation disorganized   Vital Signs  See below (reviewed 6/20/2018); Vital Signs (BP, Pulse, & Temp) are within normal limits if not listed below.    Gait and Station Stable/steady, no ataxia   Musculoskeletal System No extrapyramidal symptoms (EPS); no abnormal muscular movements or Tardive Dyskinesia (TD); muscle strength and tone are within normal limits   Language No aphasia or dysarthria   Speech:  hyperverbal and pressured   Thought Processes illogical; normal rate of thoughts; poor abstract reasoning/computation   Thought Associations circumstantial   Thought Content paranoid delusions, free of hallucinations and preoccupations   Suicidal Ideations none   Homicidal Ideations none   Mood:  hostile  and anxious    Affect:  hostile, inappropriate and increased in intensity   Memory recent  impaired   Memory remote:  intact   Concentration/Attention:  distractable   Fund of Knowledge average   Insight:  poor   Reliability poor   Judgment:  poor          VITALS:     Patient Vitals for the past 24 hrs:   Temp Pulse Resp BP   06/20/18 0652 - 79 - 129/82   06/20/18 0600 98 °F (36.7 °C) 85 16 (!) 89/50     Wt Readings from Last 3 Encounters:   06/14/18 67.6 kg (149 lb)     Temp Readings from Last 3 Encounters:   06/20/18 98 °F (36.7 °C)     BP Readings from Last 3 Encounters:   06/20/18 129/82     Pulse Readings from Last 3 Encounters:   06/20/18 79            DATA     LABORATORY DATA:(reviewed/updated 6/20/2018)  No results found for this or any previous visit (from the past 24 hour(s)). Lab Results   Component Value Date/Time    Valproic acid 65 06/18/2018 04:55 AM     No results found for: LITHM   RADIOLOGY REPORTS:(reviewed/updated 6/20/2018)  No results found.        MEDICATIONS     ALL MEDICATIONS:   Current Facility-Administered Medications   Medication Dose Route Frequency    [START ON 6/21/2018] ARIPiprazole (ABILIFY) tablet 30 mg  30 mg Oral DAILY    Or    [START ON 6/21/2018] haloperidol lactate (HALDOL) injection 2 mg+++COURT ORDERED MEDICATION FOR REFUSAL OF PO ABILIFY+++   2 mg IntraMUSCular DAILY    divalproex ER (DEPAKOTE ER) 24 hour tablet 1,500 mg  1,500 mg Oral QHS    Or    LORazepam (ATIVAN) injection 1 mg +++COURT ORDERED MEDICATION FOR REFUSAL OF PO DEPAKOTE+++  1 mg IntraMUSCular QHS    ziprasidone (GEODON) 20 mg in sterile water (preservative free) 1 mL injection  20 mg IntraMUSCular BID PRN    OLANZapine (ZyPREXA) tablet 5 mg  5 mg Oral Q6H PRN    benztropine (COGENTIN) tablet 2 mg  2 mg Oral BID PRN    benztropine (COGENTIN) injection 2 mg  2 mg IntraMUSCular BID PRN    LORazepam (ATIVAN) injection 2 mg  2 mg IntraMUSCular Q4H PRN    LORazepam (ATIVAN) tablet 1 mg  1 mg Oral Q4H PRN    zolpidem (AMBIEN) tablet 10 mg  10 mg Oral QHS PRN    acetaminophen (TYLENOL) tablet 650 mg  650 mg Oral Q4H PRN    ibuprofen (MOTRIN) tablet 400 mg  400 mg Oral Q8H PRN    magnesium hydroxide (MILK OF MAGNESIA) 400 mg/5 mL oral suspension 30 mL  30 mL Oral DAILY PRN    nicotine (NICODERM CQ) 21 mg/24 hr patch 1 Patch  1 Patch TransDERmal DAILY PRN      SCHEDULED MEDICATIONS:   Current Facility-Administered Medications   Medication Dose Route Frequency    [START ON 6/21/2018] ARIPiprazole (ABILIFY) tablet 30 mg  30 mg Oral DAILY    Or    [START ON 6/21/2018] haloperidol lactate (HALDOL) injection 2 mg+++COURT ORDERED MEDICATION FOR REFUSAL OF PO ABILIFY+++   2 mg IntraMUSCular DAILY    divalproex ER (DEPAKOTE ER) 24 hour tablet 1,500 mg  1,500 mg Oral QHS    Or    LORazepam (ATIVAN) injection 1 mg +++COURT ORDERED MEDICATION FOR REFUSAL OF PO DEPAKOTE+++  1 mg IntraMUSCular QHS          ASSESSMENT & PLAN     DIAGNOSES REQUIRING ACTIVE TREATMENT AND MONITORING: (reviewed/updated 6/20/2018)  Patient Active Hospital Problem List:  Schizophrenia [facility]) (6/11/2018)    Assessment: Paranoid ideation , irritable, poor insight. Phone restriction due to calling [facility] and threatening behavior. 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 Pt not interested in lab testing or tx    I will continue to monitor blood levels (Depakote, ---a drug with a narrow therapeutic index= NTI) and associated labs for drug therapy implemented that require intense monitoring for toxicity as deemed appropriate based on current medication side effects and pharmacodynamically determined drug 1/2 lives. - 65- subtherapeutic    In summary, Maya Merrill, is a 52 y.o.  male who presents with a severe exacerbation of the principal diagnosis of Schizophrenia ([facility] [address]  Patient's condition is worsening/not improving/not stable . Patient requires continued inpatient hospitalization for further stabilization, safety monitoring and medication management. I will continue to coordinate the provision of individual, milieu, occupational, group, and substance abuse therapies to address target symptoms/diagnoses as deemed appropriate for the individual patient.   A coordinated, multidisplinary treatment team round was conducted with the patient (this team consists of the nurse, psychiatric unit pharmcist,  and writer). Complete current electronic health record for patient has been reviewed today including consultant notes, ancillary staff notes, nurses and psychiatric tech notes. Suicide risk assessment completed and patient deemed to be of low risk for suicide at this time. The following regarding medications was addressed during rounds with patient:   the risks and benefits of the proposed medication. The patient was given the opportunity to ask questions. Informed consent given to the use of the above medications. Will continue to adjust psychiatric and non-psychiatric medications (see above \"medication\" section and orders section for details) as deemed appropriate & based upon diagnoses and response to treatment. I will continue to order blood tests/labs and diagnostic tests as deemed appropriate and review results as they become available (see orders for details and above listed lab/test results). I will order psychiatric records from previous [facility] hospitals to further elucidate the nature of patient's psychopathology and review once available. I will gather additional collateral information from friends, family and o/p treatment team to further elucidate the nature of patient's psychopathology and baselline level of psychiatric functioning. I certify that this patient's inpatient psychiatric hospital services furnished since the previous certification were, and continue to be, required for treatment that could reasonably be expected to improve the patient's condition, or for diagnostic study, and that the patient continues to need, on a daily basis, active treatment furnished directly by or requiring the supervision of inpatient psychiatric facility personnel. In addition, the hospital records show that services furnished were intensive treatment services, admission or related services, or equivalent services.     EXPECTED DISCHARGE DATE/DAY: TBD DISPOSITION: Home       Signed By:   Francisco Bryant MD  6/20/2018

## 2018-06-21 NOTE — PROGRESS NOTES
Problem: Altered Thought Process (Adult/Pediatric)  Goal: *STG: Participates in treatment plan  Outcome: Progressing Towards Goal  Pt has been resting quiet all shift.  He only slept with eyes closed 1 hr

## 2018-06-21 NOTE — BH NOTES
Patient has been visible on the unit. Attending meals and groups. Participating in RT. Patient responds to internal stimuli while in his room. Laughing and talking to auditory hallucinations. Patient denies SI/HI. Has been medication compliant this shift. Cooperative with staff. No complaints at this time. Patient will be monitored for safety per unit policy.

## 2018-06-21 NOTE — BH NOTES
SOCIAL WORK    Writer received pt's medical records via fax 5/20/2018 and relayed them to attending. Writer received telephone call from Austin Rodríguez of [employer] regarding pt's outstanding legal issues to include pt threatening the governor and [address]. Plan (which is unknown to patient at this time) is to discharge tomorrow @ 10:00 am into the custody of Esther MARIO

## 2018-06-21 NOTE — BH NOTES
GROUP THERAPY PROGRESS NOTE    The patient Aurea Coughlin a 52 y.o. male is participating in Coping Skills Group. Group time: 45 minutes    Personal goal for participation: To participate in mental health journey game    Goal orientation:  personal    Group therapy participation: active    Therapeutic interventions reviewed and discussed: choices in recovery    Impression of participation:  The patient was attentive.     David Phillips  6/21/2018  5:54 PM

## 2018-06-21 NOTE — BH NOTES
PSYCHIATRIC PROGRESS NOTE         Patient Name  Lexi Cowan   Date of Birth 1970   [facility] [id number]   Medical Record Number  [id number]      Age  52 y.o. PCP None   Admit date:  6/11/2018    Room Number  307/01  @ [facility]   Date of Service  6/21/2018          PSYCHOTHERAPY SESSION NOTE:  Length of psychotherapy session: 15 minutes    Main condition/diagnosis/issues treated during session today, 6/21/2018 : mood swings, psychosis, medical and medication    I employed Cognitive Behavioral therapy techniques, Reality-Oriented psychotherapy, as well as supportive psychotherapy in regards to various ongoing psychosocial stressors, including the following: pre-admission and current problems; housing issues; occupational issues; academic issues; legal issues; medical issues; and stress of hospitalization. Interpersonal relationship issues and psychodynamic conflicts explored. Attempts made to alleviate maladaptive patterns. We, also, worked on issues of denial & effects of substance dependency/use     Overall, patient is not progressing    Treatment Plan Update (reviewed an updated 6/21/2018) : I will modify psychotherapy tx plan by implementing more stress management strategies, building upon cognitive behavioral techniques, increasing coping skills, as well as shoring up psychological defenses). An extended energy and skill set was needed to engage pt in psychotherapy due to some of the following: resistiveness, complexity, negativity, confrontational nature, hostile behaviors, and/or severe abnormalities in thought processes/psychosis resulting in the loss of expressive/receptive language communication skills. E & M PROGRESS NOTE:         HISTORY       CC:  \"don't need to be here\"  HISTORY OF PRESENT ILLNESS/INTERVAL HISTORY:  (reviewed/updated 6/21/2018). per initial evaluation: The patient, Lexi Cowan, is a 52 y.o.   [address] male with a past psychiatric history significant for psychosis and aggression , who presents at this time with complaints of (and/or evidence of) the following emotional symptoms: agitation, delusions, psychotic behavior, paranoid behavior and psychosis. Additional symptomatology include increased irritability and poor concentration. The above symptoms have been present for months. These symptoms are of moderate severity. These symptoms are constant in nature. The patient's condition has been precipitated by legal issues and psychosocial stressors (homelessness  ). He denied using drugs and he stated he is not to be here and he was bailed out of [place] and he was trespassing and he does not want to be here and he had medications side effects like gynecomastia       Irina Gleason presents/reports/evidences the following emotional symptoms today, 6/21/2018:agitation and psychotic behavior. The above symptoms have been present for months. These symptoms are of severe severity. The symptoms are constant in nature. Additional symptomatology and features include paranoid behavior, agitation, difficulty sleeping, increased irritability, poor concentration and problem with medication. Pt received prn med for agitation and combativeness. 6/14/18 he is compliant with medications and he is court order to do medications , he is paranoid and grandiose and he has no Si or HI and he sleeps better and eating well   6/15- preoccupied, paranoid and oppositional. Now accepting court order med but limited insight. Less irritable and slept 5hrs. Affect is sl better  6/16-Remains guarded and pre-occupied. Still oppositional, received Zyprexa, Ativan x2- prn's. Tolerating increased dosage of Abilify. No SI/HI. 6/17- Improved thought organization but insight is poor. Does not believe if he needs meds or hospitalization. Slept in open seclusion and used Ambien and Vistaril-prn's. 6/18- irritable. Guarded and paranoid.  Threatening staff and tx team, preoccupied with being \"bonded out\" pressured and labile. Making sexually inappropriate comment to n/staff. Accepting med but no insight. Slept in open seclusion due to causing disturbance to his roommate/ no aggression  6/19- paranoid delusion, calling [facility] threatening to kill people. Poor insight and only taking the court order medication  6/20- remains preoccupied with delusional themes, irritable and denies calling the penitentiary threatening people but admits to calling them. poor insight  6/21- no aggressive behavior but remain preoccupied with paranoid delusional themes. He denies calling penitentiary again. Poor insight and only taking court order medication. Distractible and responding to internal stimuli which he is able to mask the sx now. SIDE EFFECTS: (reviewed/updated 6/21/2018)  None reported or admitted to. No noted toxicity with use of current med   ALLERGIES:(reviewed/updated 6/21/2018)  No Known Allergies   MEDICATIONS PRIOR TO ADMISSION:(reviewed/updated 6/21/2018)  No prescriptions prior to admission. PAST MEDICAL HISTORY: Past medical history from the initial psychiatric evaluation has been reviewed (reviewed/updated 6/21/2018) with no additional updates (I asked patient and no additional past medical history provided). No past medical history on file. No past surgical history on file. SOCIAL HISTORY: Social history from the initial psychiatric evaluation has been reviewed (reviewed/updated 6/21/2018) with no additional updates (I asked patient and no additional social history provided). Social History     Social History    Marital status: SINGLE     Spouse name: N/A    Number of children: N/A    Years of education: N/A     Occupational History    Not on file.      Social History Main Topics    Smoking status: Not on file    Smokeless tobacco: Not on file    Alcohol use Not on file    Drug use: Not on file    Sexual activity: Not on file     Other Topics Concern    Not on file     Social History Narrative      FAMILY HISTORY: Family history from the initial psychiatric evaluation has been reviewed (reviewed/updated 6/21/2018) with no additional updates (I asked patient and no additional family history provided). No family history on file. REVIEW OF SYSTEMS: (reviewed/updated 6/21/2018)  Appetite:no change from normal   Sleep: fitful   All other Review of Systems: Psychological ROS: positive for - behavioral disorder, concentration difficulties, hostility, mood swings, sleep disturbances and paranoid          [facility] (MSE):    MSE FINDINGS ARE WITHIN NORMAL LIMITS (WNL) UNLESS OTHERWISE STATED BELOW. ( ALL OF THE BELOW CATEGORIES OF THE MSE HAVE BEEN REVIEWED (reviewed 6/21/2018) AND UPDATED AS DEEMED APPROPRIATE )  General Presentation age appropriate and disheveled, evasive, uncooperative and unreliable   Orientation disorganized   Vital Signs  See below (reviewed 6/21/2018); Vital Signs (BP, Pulse, & Temp) are within normal limits if not listed below. Gait and Station Stable/steady, no ataxia   Musculoskeletal System No extrapyramidal symptoms (EPS); no abnormal muscular movements or Tardive Dyskinesia (TD); muscle strength and tone are within normal limits   Language No aphasia or dysarthria   Speech:  hyperverbal and pressured   Thought Processes illogical; normal rate of thoughts; poor abstract reasoning/computation   Thought Associations circumstantial   Thought Content paranoid delusions, free of hallucinations and preoccupations   Suicidal Ideations none   Homicidal Ideations none   Mood:  hostile  and anxious    Affect:  hostile, inappropriate and increased in intensity   Memory recent  impaired   Memory remote:  intact   Concentration/Attention:  distractable   Fund of Knowledge average   Insight:  poor   Reliability poor   Judgment:  poor          VITALS:     No data found.     Wt Readings from Last 3 Encounters:   06/14/18 67.6 kg (149 lb)     Temp Readings from Last 3 Encounters:   06/20/18 98 °F (36.7 °C)     BP Readings from Last 3 Encounters:   06/20/18 129/82     Pulse Readings from Last 3 Encounters:   06/20/18 79            DATA     LABORATORY DATA:(reviewed/updated 6/21/2018)  No results found for this or any previous visit (from the past 24 hour(s)). Lab Results   Component Value Date/Time    Valproic acid 65 06/18/2018 04:55 AM     No results found for: LITHM   RADIOLOGY REPORTS:(reviewed/updated 6/21/2018)  No results found.        MEDICATIONS     ALL MEDICATIONS:   Current Facility-Administered Medications   Medication Dose Route Frequency    ARIPiprazole (ABILIFY) tablet 30 mg  30 mg Oral DAILY    Or    haloperidol lactate (HALDOL) injection 2 mg+++COURT ORDERED MEDICATION FOR REFUSAL OF PO ABILIFY+++   2 mg IntraMUSCular DAILY    divalproex ER (DEPAKOTE ER) 24 hour tablet 1,500 mg  1,500 mg Oral QHS    Or    LORazepam (ATIVAN) injection 1 mg +++COURT ORDERED MEDICATION FOR REFUSAL OF PO DEPAKOTE+++  1 mg IntraMUSCular QHS    ziprasidone (GEODON) 20 mg in sterile water (preservative free) 1 mL injection  20 mg IntraMUSCular BID PRN    OLANZapine (ZyPREXA) tablet 5 mg  5 mg Oral Q6H PRN    benztropine (COGENTIN) tablet 2 mg  2 mg Oral BID PRN    benztropine (COGENTIN) injection 2 mg  2 mg IntraMUSCular BID PRN    LORazepam (ATIVAN) injection 2 mg  2 mg IntraMUSCular Q4H PRN    LORazepam (ATIVAN) tablet 1 mg  1 mg Oral Q4H PRN    zolpidem (AMBIEN) tablet 10 mg  10 mg Oral QHS PRN    acetaminophen (TYLENOL) tablet 650 mg  650 mg Oral Q4H PRN    ibuprofen (MOTRIN) tablet 400 mg  400 mg Oral Q8H PRN    magnesium hydroxide (MILK OF MAGNESIA) 400 mg/5 mL oral suspension 30 mL  30 mL Oral DAILY PRN    nicotine (NICODERM CQ) 21 mg/24 hr patch 1 Patch  1 Patch TransDERmal DAILY PRN      SCHEDULED MEDICATIONS:   Current Facility-Administered Medications   Medication Dose Route Frequency    ARIPiprazole (ABILIFY) tablet 30 mg  30 mg Oral DAILY    Or    haloperidol lactate (HALDOL) injection 2 mg+++COURT ORDERED MEDICATION FOR REFUSAL OF PO ABILIFY+++   2 mg IntraMUSCular DAILY    divalproex ER (DEPAKOTE ER) 24 hour tablet 1,500 mg  1,500 mg Oral QHS    Or    LORazepam (ATIVAN) injection 1 mg +++COURT ORDERED MEDICATION FOR REFUSAL OF PO DEPAKOTE+++  1 mg IntraMUSCular QHS          ASSESSMENT & PLAN     DIAGNOSES REQUIRING ACTIVE TREATMENT AND MONITORING: (reviewed/updated 6/21/2018)  Patient Active Hospital Problem List:  Schizophrenia [facility]) (6/11/2018)    Assessment: Paranoid ideation , irritable, poor insight. 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 Pt not interested in lab testing or tx    I will continue to monitor blood levels (Depakote, ---a drug with a narrow therapeutic index= NTI) and associated labs for drug therapy implemented that require intense monitoring for toxicity as deemed appropriate based on current medication side effects and pharmacodynamically determined drug 1/2 lives. - 65- subtherapeutic    In summary, Irina Gleason, is a 52 y.o.  male who presents with a severe exacerbation of the principal diagnosis of Schizophrenia ([facility] [address]  Patient's condition is worsening/not improving/not stable . Patient requires continued inpatient hospitalization for further stabilization, safety monitoring and medication management.   I will continue to coordinate the provision of individual, milieu, occupational, group, and substance abuse therapies to address target symptoms/diagnoses as deemed appropriate for the individual patient. A coordinated, multidisplinary treatment team round was conducted with the patient (this team consists of the nurse, psychiatric unit pharmcist,  and writer). Complete current electronic health record for patient has been reviewed today including consultant notes, ancillary staff notes, nurses and psychiatric tech notes. Suicide risk assessment completed and patient deemed to be of low risk for suicide at this time. The following regarding medications was addressed during rounds with patient:   the risks and benefits of the proposed medication. The patient was given the opportunity to ask questions. Informed consent given to the use of the above medications. Will continue to adjust psychiatric and non-psychiatric medications (see above \"medication\" section and orders section for details) as deemed appropriate & based upon diagnoses and response to treatment. I will continue to order blood tests/labs and diagnostic tests as deemed appropriate and review results as they become available (see orders for details and above listed lab/test results). I will order psychiatric records from previous [facility] to further elucidate the nature of patient's psychopathology and review once available. I will gather additional collateral information from friends, family and o/p treatment team to further elucidate the nature of patient's psychopathology and baselline level of psychiatric functioning. I certify that this patient's inpatient psychiatric hospital services furnished since the previous certification were, and continue to be, required for treatment that could reasonably be expected to improve the patient's condition, or for diagnostic study, and that the patient continues to need, on a daily basis, active treatment furnished directly by or requiring the supervision of inpatient psychiatric facility personnel.  In addition, the hospital records show that services furnished were intensive treatment services, admission or related services, or equivalent services.     EXPECTED DISCHARGE DATE/DAY: Friday     DISPOSITION: care home       Signed By:   Francisco Bryant MD  6/21/2018

## 2018-06-22 VITALS
OXYGEN SATURATION: 98 % | BODY MASS INDEX: 27.42 KG/M2 | RESPIRATION RATE: 16 BRPM | TEMPERATURE: 95.6 F | HEIGHT: 62 IN | HEART RATE: 71 BPM | DIASTOLIC BLOOD PRESSURE: 56 MMHG | SYSTOLIC BLOOD PRESSURE: 102 MMHG | WEIGHT: 149 LBS

## 2018-06-22 LAB — VALPROATE SERPL-MCNC: 85 UG/ML (ref 50–100)

## 2018-06-22 PROCEDURE — 80164 ASSAY DIPROPYLACETIC ACD TOT: CPT | Performed by: PSYCHIATRY & NEUROLOGY

## 2018-06-22 PROCEDURE — 74011250637 HC RX REV CODE- 250/637: Performed by: PSYCHIATRY & NEUROLOGY

## 2018-06-22 PROCEDURE — 36415 COLL VENOUS BLD VENIPUNCTURE: CPT | Performed by: PSYCHIATRY & NEUROLOGY

## 2018-06-22 RX ORDER — DIVALPROEX SODIUM 500 MG/1
1500 TABLET, EXTENDED RELEASE ORAL
Qty: 21 TAB | Refills: 0 | Status: SHIPPED | OUTPATIENT
Start: 2018-06-22 | End: 2018-06-29

## 2018-06-22 RX ORDER — ARIPIPRAZOLE 30 MG/1
30 TABLET ORAL DAILY
Qty: 7 TAB | Refills: 0 | Status: SHIPPED | OUTPATIENT
Start: 2018-06-23 | End: 2018-06-30

## 2018-06-22 RX ADMIN — ARIPIPRAZOLE 30 MG: 15 TABLET ORAL at 08:27

## 2018-06-22 NOTE — BH NOTES
Pt is alert and oriented x person, place and time. Pt denies any SI/HI or AV hallucinations. Pt denies any depression. Discharge information reviewed with patient. Pt verbalizes understanding. Pt's belongings/valuables returned. Pt to be transported by police and was detained for making threatening phone calls to [facility] [facility] [employer] .

## 2018-06-22 NOTE — DISCHARGE SUMMARY
PSYCHIATRIC DISCHARGE SUMMARY         IDENTIFICATION:    Patient Name  Melissa Morales   Date of Birth 1970   [facility] [id number]   Medical Record Number  [id number]      Age  52 y.o. PCP None   Admit date:  6/11/2018    Discharge date: 6/22/2018   Room Number  307/01  @ [address]   Date of Service  6/22/2018            TYPE OF DISCHARGE: REGULAR               CONDITION AT DISCHARGE: improved       PROVISIONAL & DISCHARGE DIAGNOSES:    Problem List  Never Reviewed          Codes Class    Kallmann syndrome ([facility] [address] ICD-10-CM: E23.6  ICD-9-CM: 253.4         * (Principal)Schizophrenia ([facility] [address] ICD-10-CM: F20.9  ICD-9-CM: 295.90               Active Hospital Problems    Kallmann syndrome ([facility] [address]      *Schizophrenia ([facility] [address]        DISCHARGE DIAGNOSIS:   Axis I:  SEE ABOVE  Axis II: SEE ABOVE  Axis III: SEE ABOVE  Axis IV:  lack of structure  Axis V:  20 on admission, 60 on discharge 60(baseline)       CC & HISTORY OF PRESENT ILLNESS:    The patient, Melissa Morales, is a 52 y.o. [place]  male with a past psychiatric history significant for psychosis and aggression , who presents at this time with complaints of (and/or evidence of) the following emotional symptoms: agitation, delusions, psychotic behavior, paranoid behavior and psychosis. Additional symptomatology include increased irritability and poor concentration. The above symptoms have been present for months. These symptoms are of moderate severity. These symptoms are constant in nature. The patient's condition has been precipitated by legal issues and psychosocial stressors (homelessness  ).  He denied using drugs and he stated he is not to be here and he was bailed out of [place] and he was trespassing and he does not want to be here and he had medications side effects like gynecomastia       SOCIAL HISTORY:    Social History     Social History    Marital status: SINGLE     Spouse name: N/A    Number of children: N/A    Years of education: N/A     Occupational History    Not on file. Social History Main Topics    Smoking status: Not on file    Smokeless tobacco: Not on file    Alcohol use Not on file    Drug use: Not on file    Sexual activity: Not on file     Other Topics Concern    Not on file     Social History Narrative      FAMILY HISTORY:   No family history on file. HOSPITALIZATION COURSE:    Nona Orellana was admitted to the inpatient psychiatric unit [facility] for acute psychiatric stabilization in regards to symptomatology as described in the HPI above. The differential diagnosis at time of admission included: bipolar dis vs. schizoaffective vs. Schizophrenia. While on the unit Nona Orellana was involved in individual, group, occupational and milieu therapy. Psychiatric medications were adjusted during this hospitalization including Abilify and Depakote. Nona Orellana demonstrated a slow, but progressive improvement in overall condition. Much of patient's depression appeared to be related to situational stressors,  and psychological factors. Please see individual progress notes for more specific details regarding patient's hospitalization course. At time of discharge, Nona Orellana is without significant problems of depression rosa. Patient free of suicidal and homicidal ideations (appears to be at very low risk of suicide or homicide) and reports many positive predictive factors in terms of not attempting suicide or homicide. Overall presentation at time of discharge is most consistent with the diagnosis of schizophreniad. Patient with request for discharge today. There are no grounds to seek a TDO. Patient has maximized benefit to be derived from acute inpatient psychiatric treatment.   All members of the treatment team concur with each other in regards to plans for discharge today per patient's request.  Patient and family are aware and in agreement with discharge and discharge plan. Per my last note: pt ct to have residual psychosis. . Denies SI/HI/AVH. LABS AND IMAGAING:    Labs Reviewed   LIPID PANEL - Abnormal; Notable for the following:        Result Value    LDL, calculated 112.6 (*)     All other components within normal limits   TSH 3RD GENERATION   GLUCOSE, FASTING   VALPROIC ACID   VALPROIC ACID     Lab Results   Component Value Date/Time    Valproic acid 85 06/22/2018 05:13 AM     Admission on 06/11/2018   Component Date Value Ref Range Status    TSH 06/12/2018 1.21  0.36 - 3.74 uIU/mL Final    LIPID PROFILE 06/12/2018        Final    Cholesterol, total 06/12/2018 191  <200 MG/DL Final    Triglyceride 06/12/2018 37  <150 MG/DL Final    HDL Cholesterol 06/12/2018 71  MG/DL Final    LDL, calculated 06/12/2018 112.6* 0 - 100 MG/DL Final    VLDL, calculated 06/12/2018 7.4  MG/DL Final    CHOL/HDL Ratio 06/12/2018 2.7  0 - 5.0   Final    Glucose 06/12/2018 85  65 - 100 MG/DL Final    Valproic acid 06/18/2018 65  50 - 100 ug/ml Final    Valproic acid 06/22/2018 85  50 - 100 ug/ml Final     No results found. DISPOSITION:     Patient to f/u with psychiatric, and psychotherapy appointments. Patient is to f/u with internist as directed. Patient should have a depakote level and associated labs checked within the next 1-2 weeks by patient's o/p psychiatrist/internist.               FOLLOW-UP CARE:    Activity as tolerated  Low fat, Low cholesterol  Wound Care: none needed.   Follow-up Information     Follow up With Details Comments Brooks Michael  Follow up to establish psychiatric & case management services through same day access Mon-Wed/7:30-3pm, Thurs./7:30-5:30pm, Fri./8-11:00am [address]  [phone]    None   None ([phone]) Patient stated that they have no PCP                   PROGNOSIS:   guarded / Poor---- based on nature of patient's pathology/ies and treatment compliance issues. Prognosis is greatly dependent upon patient's ability to follow up with psychiatric/psychotherapy appointments as well as to comply with psychiatric medications as prescribed. DISCHARGE MEDICATIONS:    Informed consent given for the use of following psychotropic medications:  Current Discharge Medication List      START taking these medications    Details   ARIPiprazole (ABILIFY) 30 mg tablet Take 1 Tab by mouth daily for 7 days. Indications: Schizophrenia  Qty: 7 Tab, Refills: 0      divalproex ER (DEPAKOTE ER) 500 mg ER tablet Take 3 Tabs by mouth nightly for 7 days. Indications: Schizophrenia  Qty: 21 Tab, Refills: 0                    A coordinated, multidisplinary treatment team round was conducted with Christia Gitelman is done daily here at [facility]. This team consists of the nurse, psychiatric unit pharmcist,  and writer. I have spent greater than 35 minutes on discharge work.     Signed:  Stacy Patel MD  6/22/2018

## 2018-06-22 NOTE — BH NOTES
Behavioral Health Transition Record to Provider    Patient Name: Melissa Morales  YOB: 1970  Medical Record Number: [id number]  Date of Admission: 6/11/2018  Date of Discharge: 6/22/2018    Attending Provider: Kiki Meadows MD  Discharging Provider: Raysa Milian  To contact this individual call [phone] and ask the  to page. If unavailable, ask to be transferred to [facility] Provider on call. [facility] Provider will be available on call 24/7 and during holidays. Primary Care Provider: None    No Known Allergies    Reason for Admission: Pt admitted on TDO by [facility] after presenting delusional, paranoid and perseverative while being released from custodial. Pt was incarcerated for property damage and trespassing after being found living in a storage unit. Admission Diagnosis: schizophrenia  Schizophrenia [facility])     Social Work-Pt will be discharged today. Pt is alert and oriented. Pt denies SI/HI. Pt is free of delusions. Pt's mood is within normal limits. Pt's thought process is impaired with slight improvement with medication compliantcy. Pt requested a binder for all his writing he has done since being hospitalized. Pt shared his letter that he wanted to send to Myles Chavira which was regarding police shooting he saw on news this morning. Pt is to follow-up with [address] [place] [address] Pkwy for same day access to secure services with psychiatry, case management & outpatient therapy. Pt will be provided prescription(s). Pt will be transported via [facility] per [address] after threats were made by pt to [facility]. [address] and [address] [place] [address]. department. Pt is in agreement with the plan. [facility] care plan will be faxed electronically via [phone] [phone].       * No surgery found *    Results for orders placed or performed during the hospital encounter of 06/11/18   TSH 3RD GENERATION   Result Value Ref Range TSH 1.21 0.36 - 3.74 uIU/mL   LIPID PANEL   Result Value Ref Range    LIPID PROFILE          Cholesterol, total 191 <200 MG/DL    Triglyceride 37 <150 MG/DL    HDL Cholesterol 71 MG/DL    LDL, calculated 112.6 (H) 0 - 100 MG/DL    VLDL, calculated 7.4 MG/DL    CHOL/HDL Ratio 2.7 0 - 5.0     GLUCOSE, FASTING   Result Value Ref Range    Glucose 85 65 - 100 MG/DL   VALPROIC ACID   Result Value Ref Range    Valproic acid 65 50 - 100 ug/ml   VALPROIC ACID   Result Value Ref Range    Valproic acid 85 50 - 100 ug/ml       Immunizations administered during this encounter: There is no immunization history on file for this patient. Screening for Metabolic Disorders for Patients on Antipsychotic Medications  (Data obtained from the EMR)    Estimated Body Mass Index  Estimated body mass index is 25.58 kg/(m^2) as calculated from the following:    Height as of this encounter: 5' 4\" (1.626 m). Weight as of this encounter: 67.6 kg (149 lb). Vital Signs/Blood Pressure  Visit Vitals    /56    Pulse 71    Temp 95.6 °F (35.3 °C)    Resp 16    Ht 5' 2\" (1.575 m)    Wt 67.6 kg (149 lb)    SpO2 98%    BMI 25.58 kg/m2       Blood Glucose/Hemoglobin A1c  Lab Results   Component Value Date/Time    Glucose 85 06/12/2018 04:38 AM       No results found for: HBA1C, HGBE8, UYT5JJKP     Lipid Panel  Lab Results   Component Value Date/Time    Cholesterol, total 191 06/12/2018 04:38 AM    HDL Cholesterol 71 06/12/2018 04:38 AM    LDL, calculated 112.6 (H) 06/12/2018 04:38 AM    Triglyceride 37 06/12/2018 04:38 AM    CHOL/HDL Ratio 2.7 06/12/2018 04:38 AM        Discharge Diagnosis: Please refer to psychiatrists' note. Discharge Plan: Pt is to follow-up with [address] same day access(M-W 7:30-3, Thurs. 7:30-5:30, Fri. 8-11) to secure psychiatry, outpatient therapy & case management services. Discharge Medication List and Instructions: There are no discharge medications for this patient.       Unresulted Labs     None        To obtain results of studies pending at discharge, please contact [phone]    Follow-up Information     Follow up With Details Comments Brooks Rodriguez  Follow up to establish psychiatric & case management services through same day access Mon-Wed/7:30-3pm, Thurs./7:30-5:30pm, Fri./8-11:00am [address]  [phone]          Advanced Directive:   Does the patient have an appointed surrogate decision maker? No  Does the patient have a Medical Advance Directive? No  Does the patient have a Psychiatric Advance Directive? No  If the patient does not have a surrogate or Medical Advance Directive AND Psychiatric Advance Directive, the patient was offered information on these advance directives Patient will complete at a later time    Patient Instructions: Please continue all medications until otherwise directed by physician. Tobacco Cessation Discharge Plan:   Is the patient a smoker and needs referral for smoking cessation? Not applicable  Patient referred to the following for smoking cessation with an appointment? Not applicable     Patient was offered medication to assist with smoking cessation at discharge? Not applicable  Was education for smoking cessation added to the discharge instructions? Not applicable    Alcohol/Substance Abuse Discharge Plan:   Does the patient have a history of substance/alcohol abuse and requires a referral for treatment? Not applicable  Patient referred to the following for substance/alcohol abuse treatment with an appointment? Not applicable  Patient was offered medication to assist with alcohol cessation at discharge? Not applicable  Was education for substance/alcohol abuse added to discharge instructions? Not applicable    Patient discharged to Home; provided to the patient/caregiver either in hard copy or electronically.

## 2018-06-22 NOTE — DISCHARGE INSTRUCTIONS
DISCHARGE SUMMARY from Nurse    PATIENT INSTRUCTIONS:  What to do at Home:  Recommended activity: Activity as tolerated  *  Please give a list of your current medications to your Primary Care Provider. *  Please update this list whenever your medications are discontinued, doses are      changed, or new medications (including over-the-counter products) are added. *  Please carry medication information at all times in case of emergency situations. These are general instructions for a healthy lifestyle:    No smoking/ No tobacco products/ Avoid exposure to second hand smoke  Surgeon General's Warning:  Quitting smoking now greatly reduces serious risk to your health. Obesity, smoking, and sedentary lifestyle greatly increases your risk for illness    A healthy diet, regular physical exercise & weight monitoring are important for maintaining a healthy lifestyle  The discharge information has been reviewed with the patient. The patient verbalized understanding. Discharge medications reviewed with the patient and appropriate educational materials and side effects teaching were provided. ___________________________________________________________________________________________________________________________________  . If I feel I am at risk of hurting myself or others, I will call the crisis office and speak with a crisis worker who will assist me during my crisis. Michael Dials [address]  [phone]  [address] [place] [address] [phone]  [address] [address] crisis- [phone]

## 2018-06-22 NOTE — PROGRESS NOTES
Problem: Altered Thought Process (Adult/Pediatric)  Goal: *STG: Participates in treatment plan  Outcome: Progressing Towards Goal  Pt rested quietly in bed with eyes closed. No signs/symptoms of distress, agitation, or anxiety. Will monitor for changes. Q 15 minute checks continue.  Pt slept 5hrs

## 2019-08-23 ENCOUNTER — OFFICE VISIT (OUTPATIENT)
Dept: INTERNAL MEDICINE CLINIC | Age: 49
End: 2019-08-23

## 2019-08-23 ENCOUNTER — HOSPITAL ENCOUNTER (OUTPATIENT)
Dept: GENERAL RADIOLOGY | Age: 49
Discharge: HOME | End: 2019-08-23
Payer: MEDICARE

## 2019-08-23 VITALS
TEMPERATURE: 97.8 F | DIASTOLIC BLOOD PRESSURE: 64 MMHG | BODY MASS INDEX: 31.88 KG/M2 | HEIGHT: 67 IN | HEART RATE: 70 BPM | WEIGHT: 203.1 LBS | SYSTOLIC BLOOD PRESSURE: 104 MMHG | OXYGEN SATURATION: 96 % | RESPIRATION RATE: 18 BRPM

## 2019-08-23 DIAGNOSIS — F20.0 PARANOID SCHIZOPHRENIA (HCC): ICD-10-CM

## 2019-08-23 DIAGNOSIS — Z00.00 MEDICARE ANNUAL WELLNESS VISIT, SUBSEQUENT: Primary | ICD-10-CM

## 2019-08-23 DIAGNOSIS — H61.23 BILATERAL IMPACTED CERUMEN: ICD-10-CM

## 2019-08-23 DIAGNOSIS — E55.9 VITAMIN D DEFICIENCY: ICD-10-CM

## 2019-08-23 DIAGNOSIS — Z76.89 ESTABLISHING CARE WITH NEW DOCTOR, ENCOUNTER FOR: ICD-10-CM

## 2019-08-23 DIAGNOSIS — E23.0 KALLMANN SYNDROME (HCC): ICD-10-CM

## 2019-08-23 DIAGNOSIS — Z11.3 SCREENING EXAMINATION FOR STD (SEXUALLY TRANSMITTED DISEASE): ICD-10-CM

## 2019-08-23 DIAGNOSIS — R63.5 WEIGHT GAIN: ICD-10-CM

## 2019-08-23 DIAGNOSIS — R35.1 NOCTURIA: ICD-10-CM

## 2019-08-23 DIAGNOSIS — M25.512 ACUTE PAIN OF LEFT SHOULDER: ICD-10-CM

## 2019-08-23 DIAGNOSIS — N62 GYNECOMASTIA: ICD-10-CM

## 2019-08-23 PROCEDURE — 73030 X-RAY EXAM OF SHOULDER: CPT

## 2019-08-23 RX ORDER — OLANZAPINE 20 MG/1
20 TABLET ORAL
COMMUNITY

## 2019-08-23 RX ORDER — FLUOXETINE HYDROCHLORIDE 20 MG/1
CAPSULE ORAL DAILY
COMMUNITY
End: 2021-03-22

## 2019-08-23 RX ORDER — NAPROXEN 500 MG/1
500 TABLET ORAL
Qty: 30 TAB | Refills: 0 | Status: SHIPPED | OUTPATIENT
Start: 2019-08-23 | End: 2020-04-26

## 2019-08-23 NOTE — PROGRESS NOTES
Pt here for   Chief Complaint   Patient presents with   [facility]    Shoulder Pain     Problem lefting arm left side     1. Have you been to the ER, urgent care clinic since your last visit? Hospitalized since your last visit? No    2. Have you seen or consulted any other health care providers outside of the [address] [place] [address] since your last visit? Include any pap smears or colon screening.  No       Pt denies pain at this time      3 most recent PHQ Screens 8/23/2019   PHQ Not Done Active Diagnosis of Depression or Bipolar Disorder

## 2019-08-23 NOTE — PROGRESS NOTES
08/23/19 called patient @ [phone] spoke to patient, Notified patient of X-ray results per provider request, and recommendations, patient understood instructions  Tiffanie Jones LPN

## 2019-08-23 NOTE — PATIENT INSTRUCTIONS
I will not be in the office next week so there may be a delay in getting your lab results. You will be contacted if there is anything urgent. 1. Labs downstairs    2. Continue care with RB    3. You have been referred to the endocrinologist at [facility] - you will get a call from our office or theirs regarding your appointment         A Healthy Lifestyle: Care Instructions  Your Care Instructions    A healthy lifestyle can help you feel good, stay at a healthy weight, and have plenty of energy for both work and play. A healthy lifestyle is something you can share with your whole family. A healthy lifestyle also can lower your risk for serious health problems, such as high blood pressure, heart disease, and diabetes. You can follow a few steps listed below to improve your health and the health of your family. Follow-up care is a key part of your treatment and safety. Be sure to make and go to all appointments, and call your doctor if you are having problems. It's also a good idea to know your test results and keep a list of the medicines you take. How can you care for yourself at home? · Do not eat too much sugar, fat, or fast foods. You can still have dessert and treats now and then. The goal is moderation. · Start small to improve your eating habits. Pay attention to portion sizes, drink less juice and soda pop, and eat more fruits and vegetables. ? Eat a healthy amount of food. A 3-ounce serving of meat, for example, is about the size of a deck of cards. Fill the rest of your plate with vegetables and whole grains. ? Limit the amount of soda and sports drinks you have every day. Drink more water when you are thirsty. ? Eat at least 5 servings of fruits and vegetables every day. It may seem like a lot, but it is not hard to reach this goal. A serving or helping is 1 piece of fruit, 1 cup of vegetables, or 2 cups of leafy, raw vegetables.  Have an apple or some carrot sticks as an afternoon snack instead of a candy bar. Try to have fruits and/or vegetables at every meal.  · Make exercise part of your daily routine. You may want to start with simple activities, such as walking, bicycling, or slow swimming. Try to be active 30 to 60 minutes every day. You do not need to do all 30 to 60 minutes all at once. For example, you can exercise 3 times a day for 10 or 20 minutes. Moderate exercise is safe for most people, but it is always a good idea to talk to your doctor before starting an exercise program.  · Keep moving. [place]joann Murrayon the lawn, work in the garden, or [employer]. Take the stairs instead of the elevator at work. · If you smoke, quit. People who smoke have an increased risk for heart attack, stroke, cancer, and other lung illnesses. Quitting is hard, but there are ways to boost your chance of quitting tobacco for good. ? Use nicotine gum, patches, or lozenges. ? Ask your doctor about stop-smoking programs and medicines. ? Keep trying. In addition to reducing your risk of diseases in the future, you will notice some benefits soon after you stop using tobacco. If you have shortness of breath or asthma symptoms, they will likely get better within a few weeks after you quit. · Limit how much alcohol you drink. Moderate amounts of alcohol (up to 2 drinks a day for men, 1 drink a day for women) are okay. But drinking too much can lead to liver problems, high blood pressure, and other health problems. Family health  If you have a family, there are many things you can do together to improve your health. · Eat meals together as a family as often as possible. · Eat healthy foods. This includes fruits, vegetables, lean meats and dairy, and whole grains. · Include your family in your fitness plan. Most people think of activities such as jogging or tennis as the way to fitness, but there are many ways you and your family can be more active. Anything that makes you breathe hard and gets your heart pumping is exercise. Here are some tips:  ? Walk to do errands or to take your child to school or the bus.  ? Go for a family bike ride after dinner instead of watching TV. Where can you learn more? Go to http://desirae-ruth.info/. Enter L820 in the search box to learn more about \"A Healthy Lifestyle: Care Instructions. \"  Current as of: September 11, 2018  Content Version: 12.1  © 3298-0094 [employer]. Care instructions adapted under license by [employer] (which disclaims liability or warranty for this information). If you have questions about a medical condition or this instruction, always ask your healthcare professional. [place] [address] any warranty or liability for your use of this information. Earwax Blockage: Care Instructions  Your Care Instructions    Earwax is a natural substance that protects the ear canal. Normally, earwax drains from the ears and does not cause problems. Sometimes earwax builds up and hardens. Earwax blockage (also called cerumen impaction) can cause some loss of hearing and pain. When wax is tightly packed, you will need to have your doctor remove it. Follow-up care is a key part of your treatment and safety. Be sure to make and go to all appointments, and call your doctor if you are having problems. It's also a good idea to know your test results and keep a list of the medicines you take. How can you care for yourself at home? · Do not try to remove earwax with cotton swabs, fingers, or other objects. This can make the blockage worse and damage the eardrum. · If your doctor recommends that you try to remove earwax at home:  ? Soften and loosen the earwax with warm mineral oil. You also can try hydrogen peroxide mixed with an equal amount of room temperature water. Place 2 drops of the fluid, warmed to body temperature, in the ear two times a day for up to 5 days.   ? Once the wax is loose and soft, all that is usually needed to remove it from the ear canal is a gentle, warm shower. Direct the water into the ear, then tip your head to let the earwax drain out. Dry your ear thoroughly with a hair dryer set on low. Hold the dryer several inches from your ear. ? If the warm mineral oil and shower do not work, use an over-the-counter wax softener. Read and follow all instructions on the label. After using the wax softener, use an ear syringe to gently flush the ear. Make sure the flushing solution is body temperature. Cool or hot fluids in the ear can cause dizziness. When should you call for help? Call your doctor now or seek immediate medical care if:    · Pus or blood drains from your ear.     · Your ears are ringing or feel full.     · You have a loss of hearing.    Watch closely for changes in your health, and be sure to contact your doctor if:    · You have pain or reduced hearing after 1 week of home treatment.     · You have any new symptoms, such as nausea or balance problems. Where can you learn more? Go to http://desirae-ruth.info/. Enter S675 in the search box to learn more about \"Earwax Blockage: Care Instructions. \"  Current as of: September 23, 2018  Content Version: 12.1  © 2625-7771 [employer]. Care instructions adapted under license by [employer] (which disclaims liability or warranty for this information). If you have questions about a medical condition or this instruction, always ask your healthcare professional. [place] [address] any warranty or liability for your use of this information.
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
## 2019-08-27 LAB
25(OH)D3+25(OH)D2 SERPL-MCNC: 14.2 NG/ML (ref 30–100)
ALBUMIN SERPL-MCNC: 4.5 G/DL (ref 3.5–5.5)
ALBUMIN/GLOB SERPL: 1.4 {RATIO} (ref 1.2–2.2)
ALP SERPL-CCNC: 74 IU/L (ref 39–117)
ALT SERPL-CCNC: 10 IU/L (ref 0–44)
AST SERPL-CCNC: 14 IU/L (ref 0–40)
BILIRUB SERPL-MCNC: <0.2 MG/DL (ref 0–1.2)
BUN SERPL-MCNC: 11 MG/DL (ref 6–24)
BUN/CREAT SERPL: 10 (ref 9–20)
C TRACH RRNA SPEC QL NAA+PROBE: NEGATIVE
CALCIUM SERPL-MCNC: 9.5 MG/DL (ref 8.7–10.2)
CHLORIDE SERPL-SCNC: 98 MMOL/L (ref 96–106)
CHOLEST SERPL-MCNC: 196 MG/DL (ref 100–199)
CO2 SERPL-SCNC: 24 MMOL/L (ref 20–29)
CREAT SERPL-MCNC: 1.08 MG/DL (ref 0.76–1.27)
ERYTHROCYTE [DISTWIDTH] IN BLOOD BY AUTOMATED COUNT: 13.6 % (ref 12.3–15.4)
GLOBULIN SER CALC-MCNC: 3.2 G/DL (ref 1.5–4.5)
GLUCOSE SERPL-MCNC: 92 MG/DL (ref 65–99)
HCT VFR BLD AUTO: 39 % (ref 37.5–51)
HDLC SERPL-MCNC: 54 MG/DL
HGB BLD-MCNC: 12.5 G/DL (ref 13–17.7)
HIV 1+2 AB+HIV1 P24 AG SERPL QL IA: NON REACTIVE
INTERPRETATION, 910389: NORMAL
LDLC SERPL CALC-MCNC: 123 MG/DL (ref 0–99)
MCH RBC QN AUTO: 26.7 PG (ref 26.6–33)
MCHC RBC AUTO-ENTMCNC: 32.1 G/DL (ref 31.5–35.7)
MCV RBC AUTO: 83 FL (ref 79–97)
N GONORRHOEA RRNA SPEC QL NAA+PROBE: NEGATIVE
PLATELET # BLD AUTO: 203 X10E3/UL (ref 150–450)
POTASSIUM SERPL-SCNC: 3.9 MMOL/L (ref 3.5–5.2)
PROT SERPL-MCNC: 7.7 G/DL (ref 6–8.5)
PSA SERPL-MCNC: <0.1 NG/ML (ref 0–4)
RBC # BLD AUTO: 4.69 X10E6/UL (ref 4.14–5.8)
REFLEX CRITERIA: NORMAL
SODIUM SERPL-SCNC: 139 MMOL/L (ref 134–144)
T PALLIDUM AB SER QL IA: NEGATIVE
T VAGINALIS RRNA VAG QL NAA+PROBE: NEGATIVE
TESTOST FREE SERPL-MCNC: 0.4 PG/ML (ref 6.8–21.5)
TESTOST SERPL-MCNC: <3 NG/DL (ref 264–916)
TRIGL SERPL-MCNC: 97 MG/DL (ref 0–149)
TSH SERPL DL<=0.005 MIU/L-ACNC: 2.24 UIU/ML (ref 0.45–4.5)
VLDLC SERPL CALC-MCNC: 19 MG/DL (ref 5–40)
WBC # BLD AUTO: 4.6 X10E3/UL (ref 3.4–10.8)

## 2020-02-27 ENCOUNTER — OFFICE VISIT (OUTPATIENT)
Dept: INTERNAL MEDICINE CLINIC | Age: 50
End: 2020-02-27

## 2020-02-27 VITALS
HEIGHT: 67 IN | DIASTOLIC BLOOD PRESSURE: 68 MMHG | TEMPERATURE: 98.5 F | BODY MASS INDEX: 31.31 KG/M2 | RESPIRATION RATE: 19 BRPM | WEIGHT: 199.5 LBS | HEART RATE: 91 BPM | SYSTOLIC BLOOD PRESSURE: 104 MMHG

## 2020-02-27 DIAGNOSIS — J02.9 SORE THROAT: ICD-10-CM

## 2020-02-27 DIAGNOSIS — R79.89 LOW TESTOSTERONE IN MALE: Primary | ICD-10-CM

## 2020-02-27 DIAGNOSIS — E23.0 KALLMAN SYNDROME (HCC): ICD-10-CM

## 2020-02-27 LAB
S PYO AG THROAT QL: NEGATIVE
VALID INTERNAL CONTROL?: YES

## 2020-02-27 NOTE — PROGRESS NOTES
Chief Complaint   Patient presents with    Follow-up     1. Have you been to the ER, urgent care clinic since your last visit? Hospitalized since your last visit? No    2. Have you seen or consulted any other health care providers outside of the [address] [place] [address] since your last visit? Include any pap smears or colon screening.  No

## 2020-02-28 NOTE — PROGRESS NOTES
Subjective: (As above and below)     Chief Complaint   Patient presents with   Raissa Reyes is a 52y.o. year old male who presents for routine f/u    Kallman syndrome/low testosterone: could not make it to endo d/t transportation issues. Has been on testosterone in the past but did not like it bc it caused nocturnal emissions. .. and does not want to resume    Schizophrenia: reports doing well on current meds    Sore throat ;for a few days, able to swallow. No fevers or other URI/sinus s/s  No known sick contacts      Reviewed PmHx, RxHx, FmHx, SocHx, AllgHx and updated in chart. Family History   Problem Relation Age of Onset    Cancer Father         prostate       Past Medical History:   Diagnosis Date    Kallmann syndrome ([facility] [address] 1980    Schizophrenia ([facility]       Social History     Socioeconomic History    Marital status: SINGLE     Spouse name: Not on file    Number of children: Not on file    Years of education: Not on file    Highest education level: Not on file   Tobacco Use    Smoking status: Never Smoker    Smokeless tobacco: Never Used   Substance and Sexual Activity    Alcohol use: Never     Frequency: Never    Drug use: Never    Sexual activity: Not Currently          Current Outpatient Medications   Medication Sig    OLANZapine (ZYPREXA) 20 mg tablet Take 20 mg by mouth nightly.  FLUoxetine (PROZAC) 20 mg capsule Take  by mouth daily.  naproxen (NAPROSYN) 500 mg tablet Take 1 Tab by mouth two (2) times daily as needed for Pain. With food. No other NSAIDS     No current facility-administered medications for this visit. Review of Systems:   Constitutional:    Negative for fever and chills, negative diaphoresis. HEENT:              Negative for neck pain and stiffness. Eyes:                  Negative for visual disturbance, itching, redness or discharge. Respiratory:        Negative for cough and shortness of breath.    Cardiovascular:  Negative for chest pain and palpitations. Gastrointestinal: Negative for nausea, vomiting, abdominal pain, diarrhea or constipation. Genitourinary:     Negative for dysuria and frequency. Musculoskeletal: Negative for falls, tenderness and swelling. Skin:                    Negative for rash, masses or lesions. Neurological:       Negative for dizzyness, seizure, loss of consciousness, weakness and numbness. Objective:     Vitals:    02/27/20 1334   BP: 104/68   Pulse: 91   Resp: 19   Temp: 98.5 °F (36.9 °C)   TempSrc: Oral   Weight: 199 lb 8 oz (90.5 kg)   Height: 5' 6.5\" (1.689 m)   PF: 98 L/min       Results for orders placed or performed in visit on 02/27/20   AMB POC RAPID STREP A   Result Value Ref Range    VALID INTERNAL CONTROL POC Yes     Group A Strep Ag Negative Negative         Physical Examination: General appearance - alert, well appearing, and in no distress and overweight. +gynecomastia  Ears - bilateral TM's and external ear canals normal  Throat: mild erythema, otherwise normal, no adenopathy  Nose - normal and patent, no erythema, discharge or polyps  Chest - clear to auscultation, no wheezes, rales or rhonchi, symmetric air entry  Heart - normal rate and regular rhythm  Extremities - no pedal edema noted      Assessment/ Plan:     Follow-up and Dispositions    · Return in about 6 months (around 8/27/2020), or if symptoms worsen or fail to improve. 1. Low testosterone in male  Discussed cardiac risks of low T  - REFERRAL TO ENDOCRINOLOGY    2. Kallman syndrome (HCC)    - REFERRAL TO ENDOCRINOLOGY    3. Sore throat  Supportive care  - AMB POC RAPID STREP A        I have discussed the diagnosis with the patient and the intended plan as seen in the above orders. The patient has received an after-visit summary and questions were answered concerning future plans. Pt conveyed understanding of plan.       Medication Side Effects and Warnings were discussed with patient: yes  Patient Labs were reviewed: yes  Patient Past Records were reviewed:  yes    Fahad Horn.  Raheel Rajput NP

## 2020-04-26 ENCOUNTER — HOSPITAL ENCOUNTER (EMERGENCY)
Age: 50
Discharge: HOME | End: 2020-04-26
Attending: EMERGENCY MEDICINE
Payer: MEDICARE

## 2020-04-26 VITALS
HEART RATE: 99 BPM | TEMPERATURE: 97.8 F | WEIGHT: 190 LBS | DIASTOLIC BLOOD PRESSURE: 77 MMHG | RESPIRATION RATE: 18 BRPM | SYSTOLIC BLOOD PRESSURE: 105 MMHG | OXYGEN SATURATION: 100 % | BODY MASS INDEX: 30.53 KG/M2 | HEIGHT: 66 IN

## 2020-04-26 DIAGNOSIS — R21 RASH AND OTHER NONSPECIFIC SKIN ERUPTION: ICD-10-CM

## 2020-04-26 DIAGNOSIS — L40.9 PSORIASIS: ICD-10-CM

## 2020-04-26 DIAGNOSIS — B36.0 TINEA VERSICOLOR: Primary | ICD-10-CM

## 2020-04-26 PROCEDURE — 99283 EMERGENCY DEPT VISIT LOW MDM: CPT

## 2020-04-26 RX ORDER — TRIAMCINOLONE ACETONIDE 5 MG/G
CREAM TOPICAL 2 TIMES DAILY
Qty: 15 G | Refills: 0 | Status: SHIPPED | OUTPATIENT
Start: 2020-04-26 | End: 2021-03-22

## 2020-04-26 RX ORDER — KETOCONAZOLE 20 MG/G
CREAM TOPICAL DAILY
Qty: 15 G | Refills: 0 | Status: SHIPPED | OUTPATIENT
Start: 2020-04-26 | End: 2021-03-22

## 2020-04-26 NOTE — ED TRIAGE NOTES
Pt reports dark patch of skin under left chest that has increased x 1 month. No pain or itching.  Also reports spots on left arm x 3 wks
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
## 2020-04-26 NOTE — ED NOTES
Pt reports hx of psoriasis. Pt denies using ointment or lotions on darkening skin. Pt denies pain or itching.

## 2020-04-26 NOTE — ED NOTES
Discharge instructions were given to the patient by Loyd Ruiz.     The patient left the Emergency Department ambulatory, alert and oriented and in no acute distress with 2 prescriptions. The patient was encouraged to call or return to the ED for worsening issues or problems and was encouraged to schedule a follow up appointment for continuing care. The patient verbalized understanding of discharge instructions and prescriptions, all questions were answered. The patient has no further concerns at this time.

## 2020-04-26 NOTE — ED NOTES
Emergency Department Nursing Plan of Care       The Nursing Plan of Care is developed from the Nursing assessment and Emergency Department Attending provider initial evaluation. The plan of care may be reviewed in the ED Provider note. The Plan of Care was developed with the following considerations:   Patient / Family readiness to learn indicated by:verbalized understanding  Persons(s) to be included in education: patient  Barriers to Learning/Limitations:No    Signed     Courtney Arambula    4/26/2020   7:04 AM    Pt reports to the ER w/ a darken skin area under left side of chest x 1 month. Reports he has recently started new medication a few months back when he reports these skin issues started. Med : Zyprexa Pt reports no pain, tenderness or itching on dark spot. Alert and oriented x 4. Skin warm dry and intact. Ambulates independently.

## 2020-04-26 NOTE — ED NOTES
Bedside and Verbal shift change report given to \[facility]\"" (oncoming nurse) by Raven Gold RN (offgoing nurse). Report included the following information SBAR, ED Summary and MAR.

## 2020-04-26 NOTE — DISCHARGE INSTRUCTIONS
Patient Education        Tinea Versicolor: Care Instructions  Your Care Instructions  Tinea versicolor is a skin infection caused by a yeast (fungus). It causes many small spots, usually on the chest and back. The spotted skin can be flaky or scaly. The spots do not tan in the sun, so they are lighter than the skin around them. Some spots may be darker than the skin around them. The yeast that causes tinea versicolor normally lives on your skin. But it becomes a problem only when warmth and humidity allow the yeast to grow rapidly and increase in number. Some people are more likely to get tinea versicolor. It does not spread from person to person. Tinea versicolor usually gets better as you age. You can treat tinea versicolor with cream or ointment that kills the yeast. You may need pills to kill the fungus if the spots cover a lot of your body. Although treatment kills the yeast quickly, your skin may not return to normal for months after treatment. You can get this condition again after treatment. Follow-up care is a key part of your treatment and safety. Be sure to make and go to all appointments, and call your doctor if you are having problems. It's also a good idea to know your test results and keep a list of the medicines you take. How can you care for yourself at home? · Follow the directions for use of creams, shampoos, or solutions. You will probably need to use them for 1 to 2 weeks. If your skin gets irritated, stop using the product, and call your doctor. · To prevent tinea versicolor, use a cream, shampoo, or solution one time a month. Your doctor may prescribe pills to prevent the spots from returning. · Dry off well after bathing. Keep your skin clean and dry. · Always wear sunscreen on exposed skin. Make sure to use a broad-spectrum sunscreen that has a sun protection factor (SPF) of 30 or higher. Use it every day, even when it is cloudy.   · If you keep getting tinea versicolor, wash your clothes in very hot water to kill the yeast.  When should you call for help? Call your doctor now or seek immediate medical care if:    · You have signs of infection such as:  ? Pain, warmth, or swelling in your skin. ? Red streaks near a wound in your skin. ? Pus coming from a wound in your skin. ? A fever.    Watch closely for changes in your health, and be sure to contact your doctor if:    · Your skin condition does not improve in 2 weeks.     · You do not get better as expected. Where can you learn more? Go to http://desirae-ruth.info/  Enter K490 in the search box to learn more about \"Tinea Versicolor: Care Instructions. \"  Current as of: October 30, 2019Content Version: 12.4  © 2705-1366 [employer]. Care instructions adapted under license by [employer] (which disclaims liability or warranty for this information). If you have questions about a medical condition or this instruction, always ask your healthcare professional. [place] [address] any warranty or liability for your use of this information. Patient Education        Psoriasis: Care Instructions  Your Care Instructions  Psoriasis (say \"rlx-RV-pd-matilda\") is a long-term skin problem that causes thick, white, silvery, or red patches on the skin. The patches may be small or large, and they occur most often on the knees, elbows, scalp, hands, feet, or lower back. The skin may be scaly. If the condition is severe, your skin can become itchy and tender. Psoriasis also can be embarrassing if the patches are on visible areas. You can treat psoriasis with good care at home and with medicine from your doctor. You may put medicine on your skin and take pills or have shots to stop the redness and swelling. Your doctor also may suggest ultraviolet light treatments. Follow-up care is a key part of your treatment and safety.  Be sure to make and go to all appointments, and call your doctor if you are having problems. It's also a good idea to know your test results and keep a list of the medicines you take. How can you care for yourself at home? · If your doctor prescribes medicine, use it exactly as prescribed. Follow your doctor's advice for sunlight or ultraviolet light treatment. Call your doctor if you think you are having a problem with your medicine. · Protect your skin:  ? Keep your skin moist. After bathing, put an ointment, cream, or lotion on your skin while it is still damp. This seals in moisture. Use over-the-counter products that your doctor suggests. These may include Cetaphil, Lubriderm, or Eucerin. Petroleum jelly (such as Vaseline) and vegetable shortening (such as Crisco) also work. ? If you have psoriasis on your scalp, use a shampoo with salicylic acid, such as Neutrogena T/Sal.  ? Avoid harsh skin products, such as those that contain alcohol. ? Cover your skin in cold weather. ? Try to prevent sunburn. Although short periods of sun exposure reduce psoriasis in most people, too much sun can damage the skin and cause skin cancer. In addition, sunburns can trigger psoriasis. Use sunscreen on areas of your skin that do not have psoriasis. Make sure to use a broad-spectrum sunscreen that has a sun protection factor (SPF) of 30 or higher. Use it every day, even when it is cloudy. ? Take care to avoid accidents such as cutting or scraping your skin. An injury to the skin can cause psoriasis patches to form anywhere on the body, including the area of the injury. ? Avoid tight shoes, clothing, watchbands, and hats. These may irritate your skin. ? Use a vaporizer or humidifier to add moisture to your bedroom. Follow the directions for cleaning the machine. · Try making one or more changes to your daily habits to help with managing your psoriasis. For example:  ? Try to control stress and anxiety. They may cause psoriasis to appear suddenly or can make symptoms worse.   ? If you smoke, think about quitting. If you need help quitting, talk to your doctor about stop-smoking programs and medicines. ? If you drink, limit or reduce the amount of alcohol you drink. ? If you are overweight, see if you can lose some weight. · Seek support from family and friends. Talk to a counselor or other professional if you feel sad about your condition and need more help. When should you call for help? Call your doctor now or seek immediate medical care if:    · You have signs of infection, such as:  ? Increased pain, swelling, warmth, or redness. ? Red streaks leading from the area. ? Pus draining from the area. ? A fever.    Watch closely for changes in your health, and be sure to contact your doctor if:    · You have swelling, stiffness, or pain in your joints.     · You do not get better as expected. Where can you learn more? Go to http://desirae-ruth.info/  Enter U759 in the search box to learn more about \"Psoriasis: Care Instructions. \"  Current as of: October 30, 2019Content Version: 12.4  © 7802-4705 [employer]. Care instructions adapted under license by [employer] (which disclaims liability or warranty for this information). If you have questions about a medical condition or this instruction, always ask your healthcare professional. [address] any warranty or liability for your use of this information.

## 2020-06-03 ENCOUNTER — PATIENT MESSAGE (OUTPATIENT)
Dept: INTERNAL MEDICINE CLINIC | Age: 50
End: 2020-06-03

## 2020-06-04 ENCOUNTER — VIRTUAL VISIT (OUTPATIENT)
Dept: INTERNAL MEDICINE CLINIC | Age: 50
End: 2020-06-04

## 2020-06-04 DIAGNOSIS — Z20.822 EXPOSURE TO COVID-19 VIRUS: ICD-10-CM

## 2020-06-04 DIAGNOSIS — R13.10 DYSPHAGIA, UNSPECIFIED TYPE: Primary | ICD-10-CM

## 2020-06-04 RX ORDER — OMEPRAZOLE 40 MG/1
40 CAPSULE, DELAYED RELEASE ORAL DAILY
Qty: 30 CAP | Refills: 0 | Status: SHIPPED | OUTPATIENT
Start: 2020-06-04 | End: 2021-03-22
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
## 2020-06-04 NOTE — PROGRESS NOTES
Chief Complaint   Patient presents with    Vomiting     pt states while eating he will feel heart burn and then he starts vomiting, pt states this seems to only happen after eating chicken     Concern For COVID-19 (Coronavirus)     possible exposure to covid-19        1. Have you been to the ER, urgent care clinic since your last visit? Hospitalized since your last visit? No    2. Have you seen or consulted any other health care providers outside of the [address] [place] [address] since your last visit? Include any pap smears or colon screening.  No

## 2020-08-27 ENCOUNTER — VIRTUAL VISIT (OUTPATIENT)
Dept: INTERNAL MEDICINE CLINIC | Age: 50
End: 2020-08-27
Payer: MEDICARE

## 2020-08-27 DIAGNOSIS — R39.198 ABNORMAL URINARY STREAM: Primary | ICD-10-CM

## 2020-08-27 DIAGNOSIS — K21.9 GASTROESOPHAGEAL REFLUX DISEASE, ESOPHAGITIS PRESENCE NOT SPECIFIED: ICD-10-CM

## 2020-08-27 DIAGNOSIS — E78.5 HYPERLIPIDEMIA, UNSPECIFIED HYPERLIPIDEMIA TYPE: ICD-10-CM

## 2020-08-27 DIAGNOSIS — R35.0 URINARY FREQUENCY: ICD-10-CM

## 2020-08-27 DIAGNOSIS — F20.9 SCHIZOPHRENIA, UNSPECIFIED TYPE (HCC): ICD-10-CM

## 2020-08-27 DIAGNOSIS — R79.89 LOW TESTOSTERONE: ICD-10-CM

## 2020-08-27 PROCEDURE — G8417 CALC BMI ABV UP PARAM F/U: HCPCS | Performed by: NURSE PRACTITIONER

## 2020-08-27 PROCEDURE — G8432 DEP SCR NOT DOC, RNG: HCPCS | Performed by: NURSE PRACTITIONER

## 2020-08-27 PROCEDURE — 99213 OFFICE O/P EST LOW 20 MIN: CPT | Performed by: NURSE PRACTITIONER

## 2020-08-27 PROCEDURE — G8428 CUR MEDS NOT DOCUMENT: HCPCS | Performed by: NURSE PRACTITIONER

## 2020-08-27 NOTE — PROGRESS NOTES
Chief Complaint   Patient presents with    Follow-up     6 month follow up     Vomiting     follow up, pt states vomiting stopped after last visit and last week he experienced vomiting after eating breakfast- eggs and sausage, pt was unable to get medication      Pt reports no pain at this time     1. Have you been to the ER, urgent care clinic since your last visit? Hospitalized since your last visit? No    2. Have you seen or consulted any other health care providers outside of the [address] [place] [address] since your last visit? Include any pap smears or colon screening.  Yes When: 07/2020 Where: [facility] Reason for visit: routine virtual

## 2020-08-27 NOTE — PROGRESS NOTES
Ced Kumar is a 52 y.o. male who was seen by synchronous (real-time) audio-video technology on 8/27/2020 for Follow-up (6 month follow up ) and Vomiting (follow up, pt states vomiting stopped after last visit and last week he experienced vomiting after eating breakfast- eggs and sausage, pt was unable to get medication )        Assessment & Plan:   Diagnoses and all orders for this visit:    1. Abnormal urinary stream  -     PSA W/ REFLX FREE PSA  -     URINALYSIS W/ RFLX MICROSCOPIC    2. Low testosterone  -     TESTOSTERONE, FREE & TOTAL    3. Schizophrenia, unspecified type (HCC)  -     METABOLIC PANEL, COMPREHENSIVE  -     CBC W/O DIFF  -     LIPID PANEL    4. Gastroesophageal reflux disease, esophagitis presence not specified    5. Hyperlipidemia, unspecified hyperlipidemia type  -     LIPID PANEL    6. Urinary frequency  -     PSA W/ REFLX FREE PSA  -     URINALYSIS W/ RFLX MICROSCOPIC      The complexity of medical decision making for this visit is moderate     recc otc PPI meds  Fu INI    Subjective:       Prior to Admission medications    Medication Sig Start Date End Date Taking? Authorizing Provider   OLANZapine (ZYPREXA) 20 mg tablet Take 20 mg by mouth nightly. Yes Provider, Historical   omeprazole (PRILOSEC) 40 mg capsule Take 1 Cap by mouth daily. 6/4/20   Deysi Wayne., JOSE J   triamcinolone (ARISTOCORT) 0.5 % topical cream Apply  to affected area two (2) times a day. use thin layer 4/26/20   Maxine Vazquez MD   ketoconazole (NIZORAL) 2 % topical cream Apply  to affected area daily. 4/26/20   Maxine Vazquez MD   FLUoxetine (PROZAC) 20 mg capsule Take  by mouth daily. Provider, Historical         ROS     GERD: seen for this a few months ago, s/s resolved and he never did get PPI (trouble w/ insurance)  He states that he was doing fine but vomited food contents a few days ago and felt epigastric burning. Has not happened since and has been eating normally.  No other GI s/s    Due for routine labs    Urinary stream problems: some frequency, no dysuria, dribbling w/ voiding      Objective:   No flowsheet data found. [INSTRUCTIONS:  \"[x]\" Indicates a positive item  \"[]\" Indicates a negative item  -- DELETE ALL ITEMS NOT EXAMINED]    Constitutional: [x] Appears well-developed and well-nourished [x] No apparent distress      [] Abnormal -     Mental status: [x] Alert and awake  [x] Oriented to person/place/time [x] Able to follow commands    [] Abnormal -     Eyes:   EOM    [x]  Normal    [] Abnormal -   Sclera  [x]  Normal    [] Abnormal -          Discharge [x]  None visible   [] Abnormal -     HENT: [x] Normocephalic, atraumatic  [] Abnormal -   [x] Mouth/Throat: Mucous membranes are moist    External Ears [x] Normal  [] Abnormal -    Neck: [x] No visualized mass [] Abnormal -     Pulmonary/Chest: [x] Respiratory effort normal   [x] No visualized signs of difficulty breathing or respiratory distress        [] Abnormal -      Musculoskeletal:   [x] Normal gait with no signs of ataxia         [x] Normal range of motion of neck        [] Abnormal -     Neurological:        [x] No Facial Asymmetry (Cranial nerve 7 motor function) (limited exam due to video visit)          [x] No gaze palsy        [] Abnormal -          Skin:        [x] No significant exanthematous lesions or discoloration noted on facial skin         [] Abnormal -            Psychiatric:       [x] Normal Affect [] Abnormal -        [x] No Hallucinations    Other pertinent observable physical exam findings:-        We discussed the expected course, resolution and complications of the diagnosis(es) in detail. Medication risks, benefits, costs, interactions, and alternatives were discussed as indicated. I advised him to contact the office if his condition worsens, changes or fails to improve as anticipated. He expressed understanding with the diagnosis(es) and plan.        Sydney Emanuel, who was evaluated through a patient-initiated, synchronous (real-time) audio-video encounter, and/or his healthcare decision maker, is aware that it is a billable service, with coverage as determined by his insurance carrier. He provided verbal consent to proceed: Yes, and patient identification was verified. It was conducted pursuant to the emergency declaration under the [address] [place] [address] and the [employer] and [employer] Act. A caregiver was present when appropriate. Ability to conduct physical exam was limited. I was at home. The patient was at home. Megan Ramos NP

## 2021-01-18 ENCOUNTER — OFFICE VISIT (OUTPATIENT)
Dept: INTERNAL MEDICINE CLINIC | Age: 51
End: 2021-01-18
Payer: MEDICARE

## 2021-01-18 VITALS
RESPIRATION RATE: 19 BRPM | OXYGEN SATURATION: 98 % | TEMPERATURE: 98.3 F | DIASTOLIC BLOOD PRESSURE: 69 MMHG | BODY MASS INDEX: 34.55 KG/M2 | SYSTOLIC BLOOD PRESSURE: 103 MMHG | HEART RATE: 79 BPM | HEIGHT: 66 IN | WEIGHT: 215 LBS

## 2021-01-18 DIAGNOSIS — Q60.0 SOLITARY KIDNEY, CONGENITAL: ICD-10-CM

## 2021-01-18 DIAGNOSIS — K62.89 RECTAL IRRITATION: ICD-10-CM

## 2021-01-18 DIAGNOSIS — Z00.00 MEDICARE ANNUAL WELLNESS VISIT, SUBSEQUENT: Primary | ICD-10-CM

## 2021-01-18 DIAGNOSIS — Z12.11 COLON CANCER SCREENING: ICD-10-CM

## 2021-01-18 DIAGNOSIS — E23.0 KALLMAN SYNDROME (HCC): ICD-10-CM

## 2021-01-18 DIAGNOSIS — F20.9 SCHIZOPHRENIA, UNSPECIFIED TYPE (HCC): ICD-10-CM

## 2021-01-18 DIAGNOSIS — Z23 ENCOUNTER FOR IMMUNIZATION: ICD-10-CM

## 2021-01-18 PROCEDURE — 99214 OFFICE O/P EST MOD 30 MIN: CPT | Performed by: NURSE PRACTITIONER

## 2021-01-18 PROCEDURE — G8417 CALC BMI ABV UP PARAM F/U: HCPCS | Performed by: NURSE PRACTITIONER

## 2021-01-18 PROCEDURE — G8427 DOCREV CUR MEDS BY ELIG CLIN: HCPCS | Performed by: NURSE PRACTITIONER

## 2021-01-18 PROCEDURE — G8432 DEP SCR NOT DOC, RNG: HCPCS | Performed by: NURSE PRACTITIONER

## 2021-01-18 PROCEDURE — 3017F COLORECTAL CA SCREEN DOC REV: CPT | Performed by: NURSE PRACTITIONER

## 2021-01-18 PROCEDURE — G0439 PPPS, SUBSEQ VISIT: HCPCS | Performed by: NURSE PRACTITIONER
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
## 2021-01-18 NOTE — PROGRESS NOTES
Chief Complaint   Patient presents with   [facility] Annual Wellness Visit     1. Have you been to the ER, urgent care clinic since your last visit? Hospitalized since your last visit? No    2. Have you seen or consulted any other health care providers outside of the [address] [place] [address] since your last visit? Include any pap smears or colon screening. No       After obtaining consent, and per orders of NP Megan  injection of  Influenza given by Leonel Lopez LPN. Patient instructed to remain in clinic for 15 minutes afterwards, and to report any adverse reaction to me immediately.

## 2021-01-21 LAB
ALBUMIN SERPL-MCNC: 4.6 G/DL (ref 4–5)
ALBUMIN/GLOB SERPL: 1.4 {RATIO} (ref 1.2–2.2)
ALP SERPL-CCNC: 87 IU/L (ref 39–117)
ALT SERPL-CCNC: 18 IU/L (ref 0–44)
APPEARANCE UR: CLEAR
AST SERPL-CCNC: 20 IU/L (ref 0–40)
BILIRUB SERPL-MCNC: 0.2 MG/DL (ref 0–1.2)
BILIRUB UR QL STRIP: NEGATIVE
BUN SERPL-MCNC: 9 MG/DL (ref 6–24)
BUN/CREAT SERPL: 9 (ref 9–20)
CALCIUM SERPL-MCNC: 9.6 MG/DL (ref 8.7–10.2)
CHLORIDE SERPL-SCNC: 100 MMOL/L (ref 96–106)
CHOLEST SERPL-MCNC: 188 MG/DL (ref 100–199)
CO2 SERPL-SCNC: 22 MMOL/L (ref 20–29)
COLOR UR: YELLOW
CREAT SERPL-MCNC: 1.01 MG/DL (ref 0.76–1.27)
ERYTHROCYTE [DISTWIDTH] IN BLOOD BY AUTOMATED COUNT: 13.2 % (ref 11.6–15.4)
GLOBULIN SER CALC-MCNC: 3.3 G/DL (ref 1.5–4.5)
GLUCOSE SERPL-MCNC: 107 MG/DL (ref 65–99)
GLUCOSE UR QL: NEGATIVE
HCT VFR BLD AUTO: 39.7 % (ref 37.5–51)
HDLC SERPL-MCNC: 61 MG/DL
HGB BLD-MCNC: 12.8 G/DL (ref 13–17.7)
HGB UR QL STRIP: NEGATIVE
INTERPRETATION, 910389: NORMAL
KETONES UR QL STRIP: NEGATIVE
LDLC SERPL CALC-MCNC: 111 MG/DL (ref 0–99)
LEUKOCYTE ESTERASE UR QL STRIP: NEGATIVE
MCH RBC QN AUTO: 27 PG (ref 26.6–33)
MCHC RBC AUTO-ENTMCNC: 32.2 G/DL (ref 31.5–35.7)
MCV RBC AUTO: 84 FL (ref 79–97)
MICRO URNS: NORMAL
NITRITE UR QL STRIP: NEGATIVE
PH UR STRIP: 6 [PH] (ref 5–7.5)
PLATELET # BLD AUTO: 207 X10E3/UL (ref 150–450)
POTASSIUM SERPL-SCNC: 4.2 MMOL/L (ref 3.5–5.2)
PROT SERPL-MCNC: 7.9 G/DL (ref 6–8.5)
PROT UR QL STRIP: NEGATIVE
PSA SERPL-MCNC: <0.1 NG/ML (ref 0–4)
RBC # BLD AUTO: 4.74 X10E6/UL (ref 4.14–5.8)
REFLEX CRITERIA: NORMAL
SODIUM SERPL-SCNC: 140 MMOL/L (ref 134–144)
SP GR UR: 1.02 (ref 1–1.03)
TESTOST FREE SERPL-MCNC: 1.5 PG/ML (ref 7.2–24)
TESTOST SERPL-MCNC: 14 NG/DL (ref 264–916)
TRIGL SERPL-MCNC: 90 MG/DL (ref 0–149)
UROBILINOGEN UR STRIP-MCNC: 0.2 MG/DL (ref 0.2–1)
VLDLC SERPL CALC-MCNC: 16 MG/DL (ref 5–40)
WBC # BLD AUTO: 5.7 X10E3/UL (ref 3.4–10.8)

## 2021-01-28 DIAGNOSIS — E23.0 KALLMAN SYNDROME (HCC): Primary | ICD-10-CM

## 2021-01-28 DIAGNOSIS — R79.89 LOW TESTOSTERONE: ICD-10-CM

## 2021-02-16 ENCOUNTER — HOSPITAL ENCOUNTER (OUTPATIENT)
Dept: BONE DENSITY | Age: 51
Discharge: HOME | End: 2021-02-16
Attending: NURSE PRACTITIONER
Payer: MEDICARE

## 2021-02-16 DIAGNOSIS — R79.89 LOW TESTOSTERONE: ICD-10-CM

## 2021-02-16 DIAGNOSIS — E23.0 KALLMAN SYNDROME (HCC): ICD-10-CM

## 2021-02-16 PROCEDURE — 77080 DXA BONE DENSITY AXIAL: CPT

## 2021-02-17 ENCOUNTER — TELEPHONE (OUTPATIENT)
Dept: INTERNAL MEDICINE CLINIC | Age: 51
End: 2021-02-17

## 2021-02-17 PROBLEM — R79.89 LOW TESTOSTERONE: Status: ACTIVE | Noted: 2021-02-17

## 2021-02-17 PROBLEM — M81.6 LOCALIZED OSTEOPOROSIS WITHOUT CURRENT PATHOLOGICAL FRACTURE: Status: ACTIVE | Noted: 2021-02-17

## 2021-02-17 RX ORDER — LYSINE HCL 500 MG
1 TABLET ORAL DAILY
Qty: 90 TAB | Refills: 1 | Status: SHIPPED | OUTPATIENT
Start: 2021-02-17

## 2021-02-17 NOTE — TELEPHONE ENCOUNTER
reece augustin  Reviewed DEXA  Calcium and vit D until sees endo  Presumably from low T/Kallman syndrome  Also discussed weight bearing exercise  Pt verb understanding

## 2021-02-19 ENCOUNTER — TELEPHONE (OUTPATIENT)
Dept: INTERNAL MEDICINE CLINIC | Age: 51
End: 2021-02-19

## 2021-02-19 NOTE — TELEPHONE ENCOUNTER
Pt called stating rx for calcium carbonate was not received by the pharmacist. I called them and they stated it is not covered by patient's insurance.   Pt  [phone] Full range of motion of upper and lower extremities, no joint tenderness/swelling.

## 2021-03-22 ENCOUNTER — VIRTUAL VISIT (OUTPATIENT)
Dept: ENDOCRINOLOGY | Age: 51
End: 2021-03-22
Payer: MEDICARE

## 2021-03-22 DIAGNOSIS — M81.8 OTHER OSTEOPOROSIS WITHOUT CURRENT PATHOLOGICAL FRACTURE: ICD-10-CM

## 2021-03-22 DIAGNOSIS — E29.1 HYPOGONADISM IN MALE: ICD-10-CM

## 2021-03-22 DIAGNOSIS — E55.9 HYPOVITAMINOSIS D: ICD-10-CM

## 2021-03-22 DIAGNOSIS — E23.0 KALLMANN SYNDROME (HCC): Primary | ICD-10-CM

## 2021-03-22 PROCEDURE — 99205 OFFICE O/P NEW HI 60 MIN: CPT | Performed by: INTERNAL MEDICINE

## 2021-03-22 PROCEDURE — G8432 DEP SCR NOT DOC, RNG: HCPCS | Performed by: INTERNAL MEDICINE

## 2021-03-22 PROCEDURE — G8427 DOCREV CUR MEDS BY ELIG CLIN: HCPCS | Performed by: INTERNAL MEDICINE

## 2021-03-22 PROCEDURE — G8417 CALC BMI ABV UP PARAM F/U: HCPCS | Performed by: INTERNAL MEDICINE

## 2021-03-22 PROCEDURE — 3017F COLORECTAL CA SCREEN DOC REV: CPT | Performed by: INTERNAL MEDICINE

## 2021-03-22 RX ORDER — TESTOSTERONE 20.25 MG/1.25G
GEL TOPICAL
Qty: 2 BOTTLE | Refills: 5 | Status: SHIPPED | OUTPATIENT
Start: 2021-03-22 | End: 2021-06-21 | Stop reason: SDUPTHER
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
## 2021-03-23 PROBLEM — E29.1 HYPOGONADISM IN MALE: Status: ACTIVE | Noted: 2021-03-23

## 2021-03-24 ENCOUNTER — DOCUMENTATION ONLY (OUTPATIENT)
Dept: ENDOCRINOLOGY | Age: 51
End: 2021-03-24

## 2021-06-14 DIAGNOSIS — E55.9 HYPOVITAMINOSIS D: ICD-10-CM

## 2021-06-14 DIAGNOSIS — E29.1 HYPOGONADISM IN MALE: ICD-10-CM

## 2021-06-14 DIAGNOSIS — M81.8 OTHER OSTEOPOROSIS WITHOUT CURRENT PATHOLOGICAL FRACTURE: ICD-10-CM

## 2021-06-14 DIAGNOSIS — E23.0 KALLMANN SYNDROME (HCC): ICD-10-CM

## 2021-06-18 LAB
25(OH)D3+25(OH)D2 SERPL-MCNC: 24.5 NG/ML (ref 30–100)
ALBUMIN SERPL-MCNC: 4.5 G/DL (ref 4–5)
BUN SERPL-MCNC: 7 MG/DL (ref 6–24)
BUN/CREAT SERPL: 5 (ref 9–20)
CALCIUM SERPL-MCNC: 10.3 MG/DL (ref 8.7–10.2)
CHLORIDE SERPL-SCNC: 99 MMOL/L (ref 96–106)
CO2 SERPL-SCNC: 27 MMOL/L (ref 20–29)
CREAT SERPL-MCNC: 1.29 MG/DL (ref 0.76–1.27)
ERYTHROCYTE [DISTWIDTH] IN BLOOD BY AUTOMATED COUNT: 13.2 % (ref 11.6–15.4)
GLUCOSE SERPL-MCNC: 102 MG/DL (ref 65–99)
HCT VFR BLD AUTO: 42.2 % (ref 37.5–51)
HGB BLD-MCNC: 13.6 G/DL (ref 13–17.7)
MCH RBC QN AUTO: 27.3 PG (ref 26.6–33)
MCHC RBC AUTO-ENTMCNC: 32.2 G/DL (ref 31.5–35.7)
MCV RBC AUTO: 85 FL (ref 79–97)
PHOSPHATE SERPL-MCNC: 2.8 MG/DL (ref 2.8–4.1)
PLATELET # BLD AUTO: 209 X10E3/UL (ref 150–450)
POTASSIUM SERPL-SCNC: 4.1 MMOL/L (ref 3.5–5.2)
PSA SERPL-MCNC: 0.2 NG/ML (ref 0–4)
RBC # BLD AUTO: 4.98 X10E6/UL (ref 4.14–5.8)
SODIUM SERPL-SCNC: 139 MMOL/L (ref 134–144)
TESTOST FREE SERPL-MCNC: 1.4 PG/ML (ref 7.2–24)
TESTOST SERPL-MCNC: 21 NG/DL (ref 264–916)
WBC # BLD AUTO: 4.9 X10E3/UL (ref 3.4–10.8)

## 2021-06-21 ENCOUNTER — OFFICE VISIT (OUTPATIENT)
Dept: ENDOCRINOLOGY | Age: 51
End: 2021-06-21
Payer: MEDICARE

## 2021-06-21 VITALS
BODY MASS INDEX: 34.23 KG/M2 | HEIGHT: 66 IN | SYSTOLIC BLOOD PRESSURE: 95 MMHG | HEART RATE: 69 BPM | DIASTOLIC BLOOD PRESSURE: 66 MMHG | WEIGHT: 213 LBS

## 2021-06-21 DIAGNOSIS — M81.8 OTHER OSTEOPOROSIS WITHOUT CURRENT PATHOLOGICAL FRACTURE: Primary | ICD-10-CM

## 2021-06-21 DIAGNOSIS — E23.0 KALLMANN SYNDROME (HCC): ICD-10-CM

## 2021-06-21 DIAGNOSIS — E55.9 HYPOVITAMINOSIS D: ICD-10-CM

## 2021-06-21 DIAGNOSIS — E29.1 HYPOGONADISM IN MALE: ICD-10-CM

## 2021-06-21 PROCEDURE — G8427 DOCREV CUR MEDS BY ELIG CLIN: HCPCS | Performed by: INTERNAL MEDICINE

## 2021-06-21 PROCEDURE — 99214 OFFICE O/P EST MOD 30 MIN: CPT | Performed by: INTERNAL MEDICINE

## 2021-06-21 PROCEDURE — G8417 CALC BMI ABV UP PARAM F/U: HCPCS | Performed by: INTERNAL MEDICINE

## 2021-06-21 PROCEDURE — 3017F COLORECTAL CA SCREEN DOC REV: CPT | Performed by: INTERNAL MEDICINE

## 2021-06-21 PROCEDURE — G8432 DEP SCR NOT DOC, RNG: HCPCS | Performed by: INTERNAL MEDICINE

## 2021-06-21 RX ORDER — TESTOSTERONE 20.25 MG/1.25G
GEL TOPICAL
Qty: 2 BOTTLE | Refills: 5 | Status: SHIPPED | OUTPATIENT
Start: 2021-06-21 | End: 2021-12-06 | Stop reason: SDUPTHER

## 2021-06-21 NOTE — LETTER
6/21/2021 Patient: Chelo Yeboah YOB: 1970 Date of Visit: 6/21/2021 Megan Fajardo, [address] [facility] [address] [facility] [address] [id number] [address] [employer] Dear Clarence Cobian. Cullen Fajardo, JOSE J, Thank you for referring Mr. Jeff Lyn to [address] [place] [address] for evaluation. My notes for this consultation are attached. If you have questions, please do not hesitate to call me. I look forward to following your patient along with you. Sincerely, Melissa Fatima MD
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
## 2021-09-01 DIAGNOSIS — E23.0 KALLMANN SYNDROME (HCC): ICD-10-CM

## 2021-09-01 DIAGNOSIS — E29.1 HYPOGONADISM IN MALE: ICD-10-CM

## 2021-10-20 ENCOUNTER — VIRTUAL VISIT (OUTPATIENT)
Dept: ENDOCRINOLOGY | Age: 51
End: 2021-10-20
Payer: MEDICARE

## 2021-10-20 DIAGNOSIS — E29.1 HYPOGONADISM IN MALE: ICD-10-CM

## 2021-10-20 DIAGNOSIS — R73.09 IMPAIRED GLUCOSE METABOLISM: ICD-10-CM

## 2021-10-20 DIAGNOSIS — E23.0 KALLMANN SYNDROME (HCC): Primary | ICD-10-CM

## 2021-10-20 DIAGNOSIS — M81.8 OTHER OSTEOPOROSIS WITHOUT CURRENT PATHOLOGICAL FRACTURE: ICD-10-CM

## 2021-10-20 DIAGNOSIS — E55.9 HYPOVITAMINOSIS D: ICD-10-CM

## 2021-10-20 PROCEDURE — G8432 DEP SCR NOT DOC, RNG: HCPCS | Performed by: INTERNAL MEDICINE

## 2021-10-20 PROCEDURE — 99214 OFFICE O/P EST MOD 30 MIN: CPT | Performed by: INTERNAL MEDICINE

## 2021-10-20 PROCEDURE — G8427 DOCREV CUR MEDS BY ELIG CLIN: HCPCS | Performed by: INTERNAL MEDICINE

## 2021-10-20 PROCEDURE — 3017F COLORECTAL CA SCREEN DOC REV: CPT | Performed by: INTERNAL MEDICINE
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
## 2021-12-03 ENCOUNTER — TELEPHONE (OUTPATIENT)
Dept: ENDOCRINOLOGY | Age: 51
End: 2021-12-03

## 2021-12-03 NOTE — TELEPHONE ENCOUNTER
Pt called stated he switched to [address] and was directed to call to have testosterone gel 20.25mg/1.25gram Rx forwarded to them. [address] #[phone].   Thank you

## 2021-12-06 DIAGNOSIS — E23.0 KALLMANN SYNDROME (HCC): ICD-10-CM

## 2021-12-06 DIAGNOSIS — E29.1 HYPOGONADISM IN MALE: ICD-10-CM

## 2021-12-06 RX ORDER — TESTOSTERONE 20.25 MG/1.25G
GEL TOPICAL
Qty: 2 EACH | Refills: 5 | Status: SHIPPED | OUTPATIENT
Start: 2021-12-06 | End: 2022-10-19 | Stop reason: SDUPTHER

## 2021-12-06 NOTE — TELEPHONE ENCOUNTER
Pt called stated he switched to [address] and was directed to call to have testosterone gel 20.25mg/1.25gram Rx forwarded to them. [address] #[phone].   Thank you

## 2022-01-20 ENCOUNTER — VIRTUAL VISIT (OUTPATIENT)
Dept: INTERNAL MEDICINE CLINIC | Age: 52
End: 2022-01-20
Payer: MEDICARE

## 2022-01-20 DIAGNOSIS — F20.9 SCHIZOPHRENIA, UNSPECIFIED TYPE (HCC): ICD-10-CM

## 2022-01-20 DIAGNOSIS — R13.19 ESOPHAGEAL DYSPHAGIA: Primary | ICD-10-CM

## 2022-01-20 DIAGNOSIS — Z00.00 MEDICARE ANNUAL WELLNESS VISIT, SUBSEQUENT: ICD-10-CM

## 2022-01-20 DIAGNOSIS — N62 GYNECOMASTIA: ICD-10-CM

## 2022-01-20 DIAGNOSIS — E23.0 KALLMAN SYNDROME (HCC): ICD-10-CM

## 2022-01-20 PROCEDURE — G8432 DEP SCR NOT DOC, RNG: HCPCS | Performed by: NURSE PRACTITIONER

## 2022-01-20 PROCEDURE — G8427 DOCREV CUR MEDS BY ELIG CLIN: HCPCS | Performed by: NURSE PRACTITIONER

## 2022-01-20 PROCEDURE — 3017F COLORECTAL CA SCREEN DOC REV: CPT | Performed by: NURSE PRACTITIONER

## 2022-01-20 PROCEDURE — G0439 PPPS, SUBSEQ VISIT: HCPCS | Performed by: NURSE PRACTITIONER

## 2022-01-20 PROCEDURE — 99213 OFFICE O/P EST LOW 20 MIN: CPT | Performed by: NURSE PRACTITIONER

## 2022-01-20 PROCEDURE — G8417 CALC BMI ABV UP PARAM F/U: HCPCS | Performed by: NURSE PRACTITIONER

## 2022-01-20 RX ORDER — OMEPRAZOLE 40 MG/1
40 CAPSULE, DELAYED RELEASE ORAL
Qty: 30 CAPSULE | Refills: 1 | Status: SHIPPED | OUTPATIENT
Start: 2022-01-20 | End: 2022-08-01 | Stop reason: SDUPTHER

## 2022-01-20 NOTE — PROGRESS NOTES
Chief Complaint   Patient presents with    Annual Wellness Visit     medicare     Vomiting     pt states he cannot eat chicken, rice, hamburger without throwing up, has been going on for years since living in care home     Other     doxy. me [phone]     Pt reports no pain at this time     1. Have you been to the ER, urgent care clinic since your last visit? Hospitalized since your last visit? No    2. Have you seen or consulted any other health care providers outside of the [address] [place] [address] since your last visit? Include any pap smears or colon screening.  No    [facility] health
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
## 2022-02-09 ENCOUNTER — HOSPITAL ENCOUNTER (OUTPATIENT)
Dept: GENERAL RADIOLOGY | Age: 52
Discharge: HOME | End: 2022-02-09
Attending: NURSE PRACTITIONER
Payer: MEDICARE

## 2022-02-09 DIAGNOSIS — R13.19 ESOPHAGEAL DYSPHAGIA: ICD-10-CM

## 2022-02-09 PROCEDURE — 74220 X-RAY XM ESOPHAGUS 1CNTRST: CPT

## 2022-02-11 ENCOUNTER — TELEPHONE (OUTPATIENT)
Dept: INTERNAL MEDICINE CLINIC | Age: 52
End: 2022-02-11

## 2022-02-11 DIAGNOSIS — K21.9 GASTROESOPHAGEAL REFLUX DISEASE WITHOUT ESOPHAGITIS: Primary | ICD-10-CM

## 2022-02-11 LAB
25(OH)D3+25(OH)D2 SERPL-MCNC: 21.3 NG/ML (ref 30–100)
ALBUMIN SERPL-MCNC: 4.3 G/DL (ref 3.8–4.9)
ALBUMIN/GLOB SERPL: 1.4 {RATIO} (ref 1.2–2.2)
ALP SERPL-CCNC: 64 IU/L (ref 44–121)
ALT SERPL-CCNC: 23 IU/L (ref 0–44)
AST SERPL-CCNC: 17 IU/L (ref 0–40)
BILIRUB SERPL-MCNC: 0.3 MG/DL (ref 0–1.2)
BUN SERPL-MCNC: 9 MG/DL (ref 6–24)
BUN/CREAT SERPL: 9 (ref 9–20)
CALCIUM SERPL-MCNC: 9.4 MG/DL (ref 8.7–10.2)
CHLORIDE SERPL-SCNC: 100 MMOL/L (ref 96–106)
CO2 SERPL-SCNC: 25 MMOL/L (ref 20–29)
CREAT SERPL-MCNC: 0.99 MG/DL (ref 0.76–1.27)
ERYTHROCYTE [DISTWIDTH] IN BLOOD BY AUTOMATED COUNT: 13.6 % (ref 11.6–15.4)
EST. AVERAGE GLUCOSE BLD GHB EST-MCNC: 131 MG/DL
GLOBULIN SER CALC-MCNC: 3.1 G/DL (ref 1.5–4.5)
GLUCOSE SERPL-MCNC: 97 MG/DL (ref 65–99)
HBA1C MFR BLD: 6.2 % (ref 4.8–5.6)
HCT VFR BLD AUTO: 43 % (ref 37.5–51)
HGB BLD-MCNC: 13.7 G/DL (ref 13–17.7)
MCH RBC QN AUTO: 27.2 PG (ref 26.6–33)
MCHC RBC AUTO-ENTMCNC: 31.9 G/DL (ref 31.5–35.7)
MCV RBC AUTO: 85 FL (ref 79–97)
PLATELET # BLD AUTO: 196 X10E3/UL (ref 150–450)
POTASSIUM SERPL-SCNC: 4.2 MMOL/L (ref 3.5–5.2)
PROT SERPL-MCNC: 7.4 G/DL (ref 6–8.5)
RBC # BLD AUTO: 5.04 X10E6/UL (ref 4.14–5.8)
SODIUM SERPL-SCNC: 141 MMOL/L (ref 134–144)
TESTOST FREE SERPL-MCNC: 4.3 PG/ML (ref 7.2–24)
TESTOST SERPL-MCNC: 204 NG/DL (ref 264–916)
WBC # BLD AUTO: 5.2 X10E3/UL (ref 3.4–10.8)

## 2022-03-18 PROBLEM — R79.89 LOW TESTOSTERONE: Status: ACTIVE | Noted: 2021-02-17

## 2022-03-19 PROBLEM — M81.6 LOCALIZED OSTEOPOROSIS WITHOUT CURRENT PATHOLOGICAL FRACTURE: Status: ACTIVE | Noted: 2021-02-17

## 2022-03-19 PROBLEM — F20.9 SCHIZOPHRENIA (HCC): Status: ACTIVE | Noted: 2018-06-11

## 2022-03-19 PROBLEM — E29.1 HYPOGONADISM IN MALE: Status: ACTIVE | Noted: 2021-03-23

## 2022-03-19 PROBLEM — E23.0 KALLMANN SYNDROME (HCC): Status: ACTIVE | Noted: 2018-06-13

## 2022-04-21 ENCOUNTER — TELEPHONE (OUTPATIENT)
Dept: ENDOCRINOLOGY | Age: 52
End: 2022-04-21

## 2022-04-21 NOTE — TELEPHONE ENCOUNTER
Pt called 4/21 @ 2:14pm. Stated he requested a refill of his testerone rx but his pharmacy said dr Sherry Messer did not sign off on something in order to refill it.     Pt# [phone]

## 2022-04-21 NOTE — TELEPHONE ENCOUNTER
Advised patient that [facility] approved generic androgel until 12/31/22. Advised to call [employer] to ask that they fill the Rx. He expressed understanding.

## 2022-08-01 ENCOUNTER — OFFICE VISIT (OUTPATIENT)
Dept: FAMILY MEDICINE CLINIC | Age: 52
End: 2022-08-01
Payer: MEDICARE

## 2022-08-01 VITALS
WEIGHT: 209 LBS | TEMPERATURE: 98.2 F | HEIGHT: 66 IN | RESPIRATION RATE: 16 BRPM | OXYGEN SATURATION: 99 % | HEART RATE: 69 BPM | DIASTOLIC BLOOD PRESSURE: 75 MMHG | SYSTOLIC BLOOD PRESSURE: 111 MMHG | BODY MASS INDEX: 33.59 KG/M2

## 2022-08-01 DIAGNOSIS — M25.512 CHRONIC PAIN OF BOTH SHOULDERS: ICD-10-CM

## 2022-08-01 DIAGNOSIS — M25.511 CHRONIC PAIN OF BOTH SHOULDERS: ICD-10-CM

## 2022-08-01 DIAGNOSIS — G89.29 CHRONIC PAIN OF BOTH SHOULDERS: ICD-10-CM

## 2022-08-01 DIAGNOSIS — R13.19 ESOPHAGEAL DYSPHAGIA: Primary | ICD-10-CM

## 2022-08-01 PROCEDURE — 99203 OFFICE O/P NEW LOW 30 MIN: CPT | Performed by: STUDENT IN AN ORGANIZED HEALTH CARE EDUCATION/TRAINING PROGRAM

## 2022-08-01 PROCEDURE — G8417 CALC BMI ABV UP PARAM F/U: HCPCS | Performed by: STUDENT IN AN ORGANIZED HEALTH CARE EDUCATION/TRAINING PROGRAM

## 2022-08-01 PROCEDURE — G8510 SCR DEP NEG, NO PLAN REQD: HCPCS | Performed by: STUDENT IN AN ORGANIZED HEALTH CARE EDUCATION/TRAINING PROGRAM

## 2022-08-01 PROCEDURE — G8427 DOCREV CUR MEDS BY ELIG CLIN: HCPCS | Performed by: STUDENT IN AN ORGANIZED HEALTH CARE EDUCATION/TRAINING PROGRAM

## 2022-08-01 PROCEDURE — 3017F COLORECTAL CA SCREEN DOC REV: CPT | Performed by: STUDENT IN AN ORGANIZED HEALTH CARE EDUCATION/TRAINING PROGRAM

## 2022-08-01 RX ORDER — OMEPRAZOLE 40 MG/1
40 CAPSULE, DELAYED RELEASE ORAL
Qty: 90 CAPSULE | Refills: 3 | Status: SHIPPED | OUTPATIENT
Start: 2022-08-01

## 2022-08-01 NOTE — PROGRESS NOTES
[address] [address] [address]. [place], [address] [place] [address]  [phone]    C/C:Medication management and establish care    HPI:    Tony Garcia is a 46 y.o. BLACK/  male who presents to clinic today for evaluation of the issues listed above. Pt is new to the practice. Previous pcp: Beryl Singh NP. No longer accept his insurance. Subjective; Patient presenting for initial office visit with me today. Pt have a PMHx significant for schizophrenia, GERD, Osteoporosis, Kallmann syndrome, low testosterone, hypoganism and secondary gynecomastia. Seeks psych through [facility] Christy Wolfe NP). Follows with Endocrinology for low Testerone (Dr. Silvana Amin). Shoulder pain, chronic:  Reports chronic bilateral shoulder pain for at least a year. No fall or trauma. ROM is normal.    Pt denies any  fever, chill, chest pain, SOB, abdominal pain, n/v/d, HA or dizziness. 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 Has had Colonoscopy recent [facility] and it was normal per patient. Will send for records. Other Specialists/providers:  Endocrinology.   Psych/behavioral     Allergies- reviewed:   No Known Allergies    Past Medical History- reviewed:  Past Medical History:   Diagnosis Date    Kallmann syndrome ([facility] 1980    Osteoporosis     Schizophrenia ([facility]        Family History - reviewed:  Family History   Problem Relation Age of Onset    Cancer Father         prostate    No Known Problems Mother     No Known Problems Brother     No Known Problems Brother     No Known Problems Brother         Kallman's syndrome    Alzheimer's Disease Maternal Grandfather     No Known Problems Paternal Grandfather        Social History - reviewed:  Social History     Socioeconomic History    Marital status: SINGLE     Spouse name: Not on file    Number of children: Not on file    Years of education: Not on file    Highest education level: Not on file   Occupational History    Not on file   Tobacco Use    Smoking status: Never    Smokeless tobacco: Never   Substance and Sexual Activity    Alcohol use: Never    Drug use: Never    Sexual activity: Not Currently   Other Topics Concern    Not on file   Social History Narrative    Not on file     Social Determinants of Health     Financial Resource Strain: Not on file   Food Insecurity: Not on file   Transportation Needs: Not on file   Physical Activity: Not on file   Stress: Not on file   Social Connections: Not on file   Intimate Partner Violence: Not on file   Housing Stability: Not on file       Depression screening:  3 most recent [address] 8/1/2022   PHQ Not Done -   Little interest or pleasure in doing things Not at all   Feeling down, depressed, irritable, or hopeless Not at all   Total Score PHQ 2 0   Trouble falling or staying asleep, or sleeping too much Not at all   Feeling tired or having little energy Not at all   Poor appetite, weight loss, or overeating Not at all   Feeling bad about yourself - or that you are a failure or have let yourself or your family down Not at all   Trouble concentrating on things such as school, work, reading, or watching TV Not at all   Moving or speaking so slowly that other people could have noticed; or the opposite being so fidgety that others notice Not at all   Thoughts of being better off dead, or hurting yourself in some way Not at all   PHQ 9 Score 0   How difficult have these problems made it for you to do your work, take care of your home and get along with others Not difficult at all         Review of systems:     A comprehensive review of systems was negative except for that written in the History of Present Illness.        Visit Vitals  /75 (BP 1 Location: Right arm, BP Patient Position: Sitting, BP Cuff Size: Adult)   Pulse 69   Temp 98.2 °F (36.8 °C) (Oral)   Resp 16   Ht 5' 6\" (1.676 m)   Wt 209 lb (94.8 kg)   SpO2 99%   BMI 33.73 kg/m²       General: Alert and oriented, in no acute distress. Well nourished. EYE: PERRL. Sclera and conjuctival clear. Extraocular movements intact. EARS: External normal, canals clear, tympanic membranes normal.   NOSE: Mucosa healthy without drainage or ulceration. OROPHARYNX: No suspicious lesions, normal dentition, pharynx, tongue and tonsils normal.  NECK: Supple; no masses; thyroid normal.  LUNGS: Respirations unlabored; clear to auscultation bilaterally. CARDIOVASCULAR: Regular, rate, and rhythm without murmurs, gallops or rubs. ABDOMEN: Soft; nontender; nondistended; normoactive bowel sounds; no masses or organomegaly. MUSCULOSKELETAL: FROM in all extremities     EXT: No edema. Neurovascularlly intact. Normal gait. SKIN: No rash. No suspicious lesions or moles. Neuro: Mental Status: Pt is alert and oriented to person, place, and time. Assessment/Plan       ICD-10-CM ICD-9-CM    1. Esophageal dysphagia  R13.19 787.29 omeprazole (PRILOSEC) 40 mg capsule      2. Chronic pain of both shoulders  M25.511 719.41     G89.29 338.29     M25.512          1. Esophageal dysphagia  - omeprazole (PRILOSEC) 40 mg capsule; Take 1 Capsule by mouth daily as needed (acid reflux). 2. Chronic pain of both shoulders  We did discuss that we could continue to seek out nonoperative modalities, such as: NSAIDs, oral and topical analgesics, joint injections, physical therapy, stretching, strengthening, and activity modification. The patient stated their understanding with this and would like to proceed with nonsurgical management as listed below;  - NSAIDs or tylenol as needed for pain if tolerated    Follow up: 3 months or sooner if needed    I have discussed the diagnosis with the patient and the intended plan as seen in the above orders.   The patient has received an after-visit summary and questions were answered concerning future plans. I have discussed medication side effects and warnings with the patient as well. Informed patient to return to the office if new symptoms arise.     Signed By: Haja Persaud MD     August 1, 2022

## 2022-08-01 NOTE — PROGRESS NOTES
Name and  Verified. Previous PCP: Bel Napier, NP ( [facility])    Pharmacy verified     Chief Complaint   Patient presents with    New Patient    Establish Care      Fiona Weiner [facility] Therapist    Patient not fasting but agreed to do labs, [facility]. 1. Have you been to the ER, urgent care clinic since your last visit? Hospitalized since your last visit? No    2. Have you seen or consulted any other health care providers outside of the [address] [place] [address] since your last visit? Include any pap smears or colon screening.  No    Health Maintenance Due   Topic Date Due    Hepatitis C Screening  Never done    DTaP/Tdap/Td series (1 - Tdap) Never done    Shingrix Vaccine Age 50> (1 of 2) Never done    Depression Screen  2022

## 2022-10-18 DIAGNOSIS — E29.1 HYPOGONADISM IN MALE: ICD-10-CM

## 2022-10-18 DIAGNOSIS — E23.0 KALLMANN SYNDROME (HCC): ICD-10-CM

## 2022-10-18 NOTE — TELEPHONE ENCOUNTER
10/18/2022  3:17 PM    Pt called to schedule appt and needs refill on his testosterone.  Please call him back at [phone]

## 2022-10-19 DIAGNOSIS — E29.1 HYPOGONADISM IN MALE: Primary | ICD-10-CM

## 2022-10-19 DIAGNOSIS — E23.0 KALLMANN SYNDROME (HCC): ICD-10-CM

## 2022-10-19 DIAGNOSIS — R73.09 IMPAIRED GLUCOSE METABOLISM: ICD-10-CM

## 2022-10-19 DIAGNOSIS — M81.8 OTHER OSTEOPOROSIS WITHOUT CURRENT PATHOLOGICAL FRACTURE: ICD-10-CM

## 2022-10-19 DIAGNOSIS — E55.9 HYPOVITAMINOSIS D: ICD-10-CM

## 2022-10-19 RX ORDER — TESTOSTERONE 20.25 MG/1.25G
GEL TOPICAL
Qty: 2 EACH | Refills: 1 | Status: SHIPPED | OUTPATIENT
Start: 2022-10-19

## 2022-12-29 ENCOUNTER — OFFICE VISIT (OUTPATIENT)
Dept: FAMILY MEDICINE CLINIC | Age: 52
End: 2022-12-29
Payer: MEDICARE

## 2022-12-29 VITALS
HEIGHT: 66 IN | WEIGHT: 213 LBS | OXYGEN SATURATION: 95 % | DIASTOLIC BLOOD PRESSURE: 79 MMHG | BODY MASS INDEX: 34.23 KG/M2 | RESPIRATION RATE: 19 BRPM | TEMPERATURE: 97.9 F | HEART RATE: 67 BPM | SYSTOLIC BLOOD PRESSURE: 113 MMHG

## 2022-12-29 DIAGNOSIS — M79.645 THUMB PAIN, LEFT: Primary | ICD-10-CM

## 2022-12-29 DIAGNOSIS — S62.502A FRACTURE OF UNSPECIFIED PHALANX OF LEFT THUMB, INITIAL ENCOUNTER FOR CLOSED FRACTURE: ICD-10-CM

## 2022-12-29 NOTE — PROGRESS NOTES
Identified pt with two pt identifiers(name and ). Reviewed record in preparation for visit and have obtained necessary documentation. Chief Complaint   Patient presents with    Follow-up     4 month follow up   Last OV 22    Thumb Pain     Left thump pain slammed in door last week         Vitals:    22 1217   BP: 113/79   Pulse: 67   Resp: 19   Temp: 97.9 °F (36.6 °C)   TempSrc: Oral   SpO2: 95%   Weight: 213 lb (96.6 kg)   Height: 5' 6\" (1.676 m)       Health Maintenance Due   Topic    Hepatitis C Screening     DTaP/Tdap/Td series (1 - Tdap)    Shingles Vaccine (1 of 2)    COVID-19 Vaccine (3 - Booster for [employer] series)    Flu Vaccine (1)    Medicare Yearly Exam        Coordination of Care Questionnaire:  :   1) Have you been to an emergency room, urgent care, or hospitalized since your last visit? If yes, where when, and reason for visit? no       2. Have seen or consulted any other health care provider since your last visit? If yes, where when, and reason for visit? NO      Patient is accompanied by self I have received verbal consent from Pooja Almanzar to discuss any/all medical information while they are present in the room.

## 2022-12-29 NOTE — PROGRESS NOTES
[address]  [address] [facility], [address]  [phone]    Chief Complaint: Left thumb pain    Subjective  Bienvenido Velázquez is a 46 y.o. BLACK/ male , established patient, here for evaluation of the concern(s) above;    Left thumb pain:  Pt states that he left thumb was trapped in the car door abotu 10 days ago. States that it was painful and mildly swollen initially. Feels like it is improving but still mildly swollen. Pain at about 2 out of 10. Allergies - reviewed:   No Known Allergies    Past Medical History - reviewed:  Past Medical History:   Diagnosis Date    Kallmann syndrome ([facility] [address] [facility] 1980    Osteoporosis     Schizophrenia ([facility] [address]        Depression screening:  3 most recent PHQ Screens 8/1/2022   PHQ Not Done -   Little interest or pleasure in doing things Not at all   Feeling down, depressed, irritable, or hopeless Not at all   Total Score PHQ 2 0   Trouble falling or staying asleep, or sleeping too much Not at all   Feeling tired or having little energy Not at all   Poor appetite, weight loss, or overeating Not at all   Feeling bad about yourself - or that you are a failure or have let yourself or your family down Not at all   Trouble concentrating on things such as school, work, reading, or watching TV Not at all   Moving or speaking so slowly that other people could have noticed; or the opposite being so fidgety that others notice Not at all   Thoughts of being better off dead, or hurting yourself in some way Not at all   PHQ 9 Score 0   How difficult have these problems made it for you to do your work, take care of your home and get along with others Not difficult at all         Review of systems:      A comprehensive review of systems was negative except for that written in the History of Present Illness.        Physical Exam  Visit Vitals  /79   Pulse 67   Temp 97.9 °F (36.6 °C) (Oral)   Resp 19   Ht 5' 6\" (1.676 m)   Wt 213 lb (96.6 kg)   SpO2 95%   BMI 34.38 kg/m²       General: Alert and oriented, in no acute distress. Well nourished. LUNGS: Respirations unlabored; clear to auscultation bilaterally. CARDIOVASCULAR: Regular, rate, and rhythm without murmurs, gallops or rubs. ABDOMEN: Soft; nontender; nondistended; normoactive bowel sounds; no masses or organomegaly. MUSCULOSKELETAL: Tip of left thumb is swollen. Neurovascularlly intact. Normal gait. Neurological: Alert and oriented X 3, normal strength and tone. Normal symmetric reflexes. Normal coordination and gait       Assessment/Plan    ICD-10-CM ICD-9-CM    1. Thumb pain, left  M79.645 729.5 XR THUMB LT MIN 2 V      2. Fracture of unspecified phalanx of left thumb, initial encounter for closed fracture  S62.502A 816.00 REFERRAL TO ORTHOPEDICS        1. Thumb pain, left    - XR THUMB LT MIN 2 V; Future - Appears to have fractured the left PIP based on xray. Still awaiting official reading.  - NSAIDs prn for pain  - Thumb splint   - Follow up with ortho    2. Fracture of unspecified phalanx of left thumb, initial encounter for closed fracture  - REFERRAL TO ORTHOPEDICS        Follow-up and Dispositions    Return if symptoms worsen or fail to improve. We discussed the expected course, resolution and complications of the diagnosis(es) in detail. Medication risks, benefits, costs, interactions, and alternatives were discussed as indicated. I advised him to contact the office if his condition worsens, changes or fails to improve as anticipated. He expressed understanding with the diagnosis(es) and plan. Patient understands that this encounter was a temporary measure, and the importance of further follow up and examination was emphasized. Patient verbalized understanding.       Signed By: Elisha Clifford MD     December 29, 2022

## 2022-12-29 NOTE — PATIENT INSTRUCTIONS
Nondisplaced fractures of Phalanx:  Nondisplaced stable fractures of the middle phalanx without angulation are treated by jovanni taping them to an adjacent finger. If the ring finger is involved, it should be jovanni taped to the little finger. Short-term immobilization with a dorsal or volar finger splint or a toad splint can be used for added protection or pain control. Discussed that Nondisplaced fractures should be reevaluated by physical examination and radiographs within one week injury to check for displacement, angulation, or rotational deformity. If no malalignment is detected, jovanni taping is continued for a total of approximately four to six weeks, until clinical healing has occurred. Range of motion exercises to restore normal mobility at the distal interphalangeal (DIP), proximal interphalangeal (PIP), and metacarpophalangeal (MCP) joints should be encouraged while jovanni taping is continued . The finger should be examined every one to two weeks until range of motion and finger function is normal. Radiographs can be obtained three to four weeks after the first follow-up appointment to ensure healing, but are not necessary unless malalignment or inadequate clinical healing occurs (eg, pain persists or function is compromised). Frequently, radiographic healing lags behind clinical healing.

## 2023-01-11 ENCOUNTER — OFFICE VISIT (OUTPATIENT)
Dept: FAMILY MEDICINE CLINIC | Age: 53
End: 2023-01-11
Payer: MEDICARE

## 2023-01-11 VITALS
WEIGHT: 216 LBS | HEART RATE: 71 BPM | RESPIRATION RATE: 17 BRPM | OXYGEN SATURATION: 98 % | BODY MASS INDEX: 34.72 KG/M2 | SYSTOLIC BLOOD PRESSURE: 110 MMHG | HEIGHT: 66 IN | DIASTOLIC BLOOD PRESSURE: 77 MMHG | TEMPERATURE: 98.1 F

## 2023-01-11 DIAGNOSIS — Z00.00 MEDICARE ANNUAL WELLNESS VISIT, SUBSEQUENT: Primary | ICD-10-CM

## 2023-01-11 DIAGNOSIS — F20.9 SCHIZOPHRENIA, UNSPECIFIED TYPE (HCC): ICD-10-CM

## 2023-01-11 DIAGNOSIS — S62.512D CLOSED DISPLACED FRACTURE OF PROXIMAL PHALANX OF LEFT THUMB WITH ROUTINE HEALING, SUBSEQUENT ENCOUNTER: ICD-10-CM

## 2023-01-11 DIAGNOSIS — E23.0 KALLMAN SYNDROME (HCC): ICD-10-CM

## 2023-01-11 RX ORDER — OLANZAPINE 20 MG/1
20 TABLET ORAL
Qty: 30 TABLET | Refills: 0
Start: 2023-01-11

## 2023-01-11 NOTE — PROGRESS NOTES
[address]  Wallace Muñoz. Daiana, Nina[address] [place] [address]  [phone]    Chief Complaint: Left thumb pain    Subjective  Padmini Sloan is a 46 y.o. BLACK/ male , established patient, here for evaluation of the concern(s) above;    Left thumb pain:  Distal phalanx fracture of the left thumb. Pain now at 2/10 which is better per pt. On a thumb splint. Saw ortho. Will follow up in 4 weeks. 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    Osteoporosis     Schizophrenia ([facility]        Depression screening:  3 most recent PHQ Screens 1/11/2023   PHQ Not Done -   Little interest or pleasure in doing things Not at all   Feeling down, depressed, irritable, or hopeless Not at all   Total Score PHQ 2 0   Trouble falling or staying asleep, or sleeping too much -   Feeling tired or having little energy -   Poor appetite, weight loss, or overeating -   Feeling bad about yourself - or that you are a failure or have let yourself or your family down -   Trouble concentrating on things such as school, work, reading, or watching TV -   Moving or speaking so slowly that other people could have noticed; or the opposite being so fidgety that others notice -   Thoughts of being better off dead, or hurting yourself in some way -   PHQ 9 Score -   How difficult have these problems made it for you to do your work, take care of your home and get along with others -         Review of systems:      A comprehensive review of systems was negative except for that written in the History of Present Illness. Physical Exam  Visit Vitals  /77   Pulse 71   Temp 98.1 °F (36.7 °C)   Resp 17   Ht 5' 6\" (1.676 m)   Wt 216 lb (98 kg)   SpO2 98%   BMI 34.86 kg/m²       General: Alert and oriented, in no acute distress. Well nourished.    LUNGS: Respirations unlabored; clear to auscultation bilaterally. CARDIOVASCULAR: Regular, rate, and rhythm without murmurs, gallops or rubs. ABDOMEN: Soft; nontender; nondistended; normoactive bowel sounds; no masses or organomegaly. MUSCULOSKELETAL: Tip splint in place. Neurovascularlly intact. Normal gait. Neurological: Alert and oriented X 3, normal strength and tone. Normal symmetric reflexes. Normal coordination and gait       Assessment/Plan    ICD-10-CM ICD-9-CM    1. Medicare annual wellness visit, subsequent  Z00.00 V70.0       2. Closed displaced fracture of proximal phalanx of left thumb with routine healing, subsequent encounter  S62.512D V54.19       3. Schizophrenia, unspecified type ([address]  F20.9 295.90 OLANZapine (ZyPREXA) 20 mg tablet      4. Kallman syndrome (HCC)  E23.0 253.4           1. Closed displaced fracture of proximal phalanx of left thumb with routine healing, subsequent encounter  Improving. Follow up with ortho    2. Schizophrenia, unspecified type [facility])  Follows with psychiatry Nurse Practitioner at [facility]  - OLANZapine (ZyPREXA) 20 mg tablet; Take 1 Tablet by mouth nightly. Dispense: 30 Tablet; Refill: 0    3. Kallman syndrome ([address]  Stable. We discussed the expected course, resolution and complications of the diagnosis(es) in detail. Medication risks, benefits, costs, interactions, and alternatives were discussed as indicated. I advised to contact the office if his condition worsens, changes or fails to improve as anticipated. Pt expressed understanding with the diagnosis(es) and plan. Patient understands that this encounter was a temporary measure, and the importance of further follow up and examination was emphasized. Patient verbalized understanding.       Signed By: Dmitriy Garcia MD     January 11, 2023
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
## 2023-01-11 NOTE — PROGRESS NOTES
Chief Complaint   Patient presents with    Follow-up     Here for follow up to PENG off thumb on left hand. He states the pain has gone down significantly. 1. Have you been to the ER, urgent care clinic since your last visit? Hospitalized since your last visit? No    2. Have you seen or consulted any other health care providers outside of the [address] [place] [address] since your last visit? Include any pap smears or colon screening.  No

## 2023-04-28 ENCOUNTER — DOCUMENTATION ONLY (OUTPATIENT)
Dept: ENDOCRINOLOGY | Age: 53
End: 2023-04-28

## 2023-05-20 RX ORDER — OLANZAPINE 20 MG/1
1 TABLET ORAL NIGHTLY
COMMUNITY
Start: 2023-01-11

## 2023-05-20 RX ORDER — TESTOSTERONE 16.2 MG/G
GEL TRANSDERMAL
COMMUNITY
Start: 2022-10-19

## 2023-05-20 RX ORDER — OMEPRAZOLE 40 MG/1
40 CAPSULE, DELAYED RELEASE ORAL DAILY PRN
COMMUNITY
Start: 2022-08-01 | End: 2023-07-13 | Stop reason: SDUPTHER

## 2023-07-03 DIAGNOSIS — E23.0 HYPOPITUITARISM (HCC): Primary | ICD-10-CM

## 2023-07-03 DIAGNOSIS — E55.9 VITAMIN D DEFICIENCY, UNSPECIFIED: ICD-10-CM

## 2023-07-03 DIAGNOSIS — M81.8 OTHER OSTEOPOROSIS WITHOUT CURRENT PATHOLOGICAL FRACTURE: ICD-10-CM

## 2023-07-03 DIAGNOSIS — E29.1 TESTICULAR HYPOFUNCTION: ICD-10-CM

## 2023-07-03 DIAGNOSIS — R73.09 OTHER ABNORMAL GLUCOSE: ICD-10-CM

## 2023-07-05 ENCOUNTER — CLINICAL DOCUMENTATION (OUTPATIENT)
Age: 53
End: 2023-07-05

## 2023-07-13 ENCOUNTER — OFFICE VISIT (OUTPATIENT)
Age: 53
End: 2023-07-13
Payer: MEDICARE

## 2023-07-13 VITALS
SYSTOLIC BLOOD PRESSURE: 115 MMHG | OXYGEN SATURATION: 96 % | HEIGHT: 66 IN | BODY MASS INDEX: 35.07 KG/M2 | RESPIRATION RATE: 18 BRPM | HEART RATE: 80 BPM | TEMPERATURE: 97.8 F | WEIGHT: 218.2 LBS | DIASTOLIC BLOOD PRESSURE: 74 MMHG

## 2023-07-13 DIAGNOSIS — K21.9 GASTROESOPHAGEAL REFLUX DISEASE, UNSPECIFIED WHETHER ESOPHAGITIS PRESENT: Primary | ICD-10-CM

## 2023-07-13 LAB
25(OH)D3+25(OH)D2 SERPL-MCNC: 32.9 NG/ML (ref 30–100)
ALBUMIN SERPL-MCNC: 4.5 G/DL (ref 3.8–4.9)
ALBUMIN/GLOB SERPL: 1.5 {RATIO} (ref 1.2–2.2)
ALP SERPL-CCNC: 74 IU/L (ref 44–121)
ALT SERPL-CCNC: 21 IU/L (ref 0–44)
AST SERPL-CCNC: 22 IU/L (ref 0–40)
BASOPHILS # BLD AUTO: 0 X10E3/UL (ref 0–0.2)
BASOPHILS NFR BLD AUTO: 0 %
BILIRUB SERPL-MCNC: 0.3 MG/DL (ref 0–1.2)
BUN SERPL-MCNC: 11 MG/DL (ref 6–24)
BUN/CREAT SERPL: 11 (ref 9–20)
CALCIUM SERPL-MCNC: 10 MG/DL (ref 8.7–10.2)
CHLORIDE SERPL-SCNC: 100 MMOL/L (ref 96–106)
CO2 SERPL-SCNC: 25 MMOL/L (ref 20–29)
CREAT SERPL-MCNC: 1.02 MG/DL (ref 0.76–1.27)
EGFRCR SERPLBLD CKD-EPI 2021: 88 ML/MIN/1.73
EOSINOPHIL # BLD AUTO: 0.1 X10E3/UL (ref 0–0.4)
EOSINOPHIL NFR BLD AUTO: 2 %
ERYTHROCYTE [DISTWIDTH] IN BLOOD BY AUTOMATED COUNT: 13.3 % (ref 11.6–15.4)
GLOBULIN SER CALC-MCNC: 3 G/DL (ref 1.5–4.5)
GLUCOSE SERPL-MCNC: 149 MG/DL (ref 70–99)
HBA1C MFR BLD: 6.6 % (ref 4.8–5.6)
HCT VFR BLD AUTO: 40.2 % (ref 37.5–51)
HGB BLD-MCNC: 13.1 G/DL (ref 13–17.7)
IMM GRANULOCYTES # BLD AUTO: 0 X10E3/UL (ref 0–0.1)
IMM GRANULOCYTES NFR BLD AUTO: 0 %
LYMPHOCYTES # BLD AUTO: 2.4 X10E3/UL (ref 0.7–3.1)
LYMPHOCYTES NFR BLD AUTO: 44 %
MCH RBC QN AUTO: 27.1 PG (ref 26.6–33)
MCHC RBC AUTO-ENTMCNC: 32.6 G/DL (ref 31.5–35.7)
MCV RBC AUTO: 83 FL (ref 79–97)
MONOCYTES # BLD AUTO: 0.5 X10E3/UL (ref 0.1–0.9)
MONOCYTES NFR BLD AUTO: 8 %
NEUTROPHILS # BLD AUTO: 2.4 X10E3/UL (ref 1.4–7)
NEUTROPHILS NFR BLD AUTO: 46 %
PHOSPHATE SERPL-MCNC: 2.9 MG/DL (ref 2.8–4.1)
PLATELET # BLD AUTO: 221 X10E3/UL (ref 150–450)
POTASSIUM SERPL-SCNC: 4.5 MMOL/L (ref 3.5–5.2)
PROT SERPL-MCNC: 7.5 G/DL (ref 6–8.5)
PSA SERPL-MCNC: 0.2 NG/ML (ref 0–4)
RBC # BLD AUTO: 4.84 X10E6/UL (ref 4.14–5.8)
REFLEX CRITERIA: NORMAL
SODIUM SERPL-SCNC: 140 MMOL/L (ref 134–144)
WBC # BLD AUTO: 5.4 X10E3/UL (ref 3.4–10.8)

## 2023-07-13 PROCEDURE — 3017F COLORECTAL CA SCREEN DOC REV: CPT | Performed by: STUDENT IN AN ORGANIZED HEALTH CARE EDUCATION/TRAINING PROGRAM

## 2023-07-13 PROCEDURE — G8427 DOCREV CUR MEDS BY ELIG CLIN: HCPCS | Performed by: STUDENT IN AN ORGANIZED HEALTH CARE EDUCATION/TRAINING PROGRAM

## 2023-07-13 PROCEDURE — 1036F TOBACCO NON-USER: CPT | Performed by: STUDENT IN AN ORGANIZED HEALTH CARE EDUCATION/TRAINING PROGRAM

## 2023-07-13 PROCEDURE — G8417 CALC BMI ABV UP PARAM F/U: HCPCS | Performed by: STUDENT IN AN ORGANIZED HEALTH CARE EDUCATION/TRAINING PROGRAM

## 2023-07-13 PROCEDURE — 99213 OFFICE O/P EST LOW 20 MIN: CPT | Performed by: STUDENT IN AN ORGANIZED HEALTH CARE EDUCATION/TRAINING PROGRAM

## 2023-07-13 RX ORDER — LIDOCAINE HYDROCHLORIDE 20 MG/ML
SOLUTION OROPHARYNGEAL
COMMUNITY
Start: 2017-11-27

## 2023-07-13 RX ORDER — OYSTER SHELL CALCIUM WITH VITAMIN D 500; 200 MG/1; [IU]/1
1 TABLET, FILM COATED ORAL DAILY
COMMUNITY
Start: 2021-02-17

## 2023-07-13 RX ORDER — IBUPROFEN 800 MG/1
800 TABLET ORAL EVERY 6 HOURS PRN
COMMUNITY
Start: 2017-11-27

## 2023-07-13 RX ORDER — OMEPRAZOLE 40 MG/1
40 CAPSULE, DELAYED RELEASE ORAL DAILY
Qty: 90 CAPSULE | Refills: 3 | Status: SHIPPED | OUTPATIENT
Start: 2023-07-13

## 2023-07-13 SDOH — ECONOMIC STABILITY: FOOD INSECURITY: WITHIN THE PAST 12 MONTHS, THE FOOD YOU BOUGHT JUST DIDN'T LAST AND YOU DIDN'T HAVE MONEY TO GET MORE.: NEVER TRUE

## 2023-07-13 SDOH — ECONOMIC STABILITY: HOUSING INSECURITY
IN THE LAST 12 MONTHS, WAS THERE A TIME WHEN YOU DID NOT HAVE A STEADY PLACE TO SLEEP OR SLEPT IN A SHELTER (INCLUDING NOW)?: NO

## 2023-07-13 SDOH — ECONOMIC STABILITY: FOOD INSECURITY: WITHIN THE PAST 12 MONTHS, YOU WORRIED THAT YOUR FOOD WOULD RUN OUT BEFORE YOU GOT MONEY TO BUY MORE.: NEVER TRUE

## 2023-07-13 SDOH — ECONOMIC STABILITY: INCOME INSECURITY: HOW HARD IS IT FOR YOU TO PAY FOR THE VERY BASICS LIKE FOOD, HOUSING, MEDICAL CARE, AND HEATING?: NOT VERY HARD

## 2023-07-13 ASSESSMENT — PATIENT HEALTH QUESTIONNAIRE - PHQ9
2. FEELING DOWN, DEPRESSED OR HOPELESS: 0
SUM OF ALL RESPONSES TO PHQ QUESTIONS 1-9: 0
SUM OF ALL RESPONSES TO PHQ9 QUESTIONS 1 & 2: 0
SUM OF ALL RESPONSES TO PHQ QUESTIONS 1-9: 0
1. LITTLE INTEREST OR PLEASURE IN DOING THINGS: 0

## 2023-07-13 NOTE — PROGRESS NOTES
Chief Complaint   Patient presents with    Follow-up     Patient is here today for a 6 month follow up. 1. Have you been to the ER, urgent care clinic since your last visit? Hospitalized since your last visit? No    2. Have you seen or consulted any other health care providers outside of the [address] [place] [address] since your last visit? Include any pap smears or colon screening.  No

## 2023-07-17 LAB
TESTOST FREE SERPL-MCNC: 1.4 PG/ML (ref 7.2–24)
TESTOST SERPL-MCNC: 93.4 NG/DL (ref 264–916)

## 2023-07-19 ENCOUNTER — OFFICE VISIT (OUTPATIENT)
Age: 53
End: 2023-07-19
Payer: MEDICARE

## 2023-07-19 VITALS
DIASTOLIC BLOOD PRESSURE: 62 MMHG | WEIGHT: 218.2 LBS | HEART RATE: 74 BPM | SYSTOLIC BLOOD PRESSURE: 101 MMHG | HEIGHT: 66 IN | BODY MASS INDEX: 35.07 KG/M2

## 2023-07-19 DIAGNOSIS — M81.8 OTHER OSTEOPOROSIS WITHOUT CURRENT PATHOLOGICAL FRACTURE: ICD-10-CM

## 2023-07-19 DIAGNOSIS — E23.0 KALLMAN SYNDROME (HCC): Primary | ICD-10-CM

## 2023-07-19 DIAGNOSIS — E29.1 HYPOGONADISM IN MALE: ICD-10-CM

## 2023-07-19 DIAGNOSIS — E55.9 VITAMIN D DEFICIENCY, UNSPECIFIED: ICD-10-CM

## 2023-07-19 PROCEDURE — G8427 DOCREV CUR MEDS BY ELIG CLIN: HCPCS | Performed by: INTERNAL MEDICINE

## 2023-07-19 PROCEDURE — 3017F COLORECTAL CA SCREEN DOC REV: CPT | Performed by: INTERNAL MEDICINE

## 2023-07-19 PROCEDURE — G8417 CALC BMI ABV UP PARAM F/U: HCPCS | Performed by: INTERNAL MEDICINE

## 2023-07-19 PROCEDURE — 99214 OFFICE O/P EST MOD 30 MIN: CPT | Performed by: INTERNAL MEDICINE

## 2023-07-19 PROCEDURE — 1036F TOBACCO NON-USER: CPT | Performed by: INTERNAL MEDICINE
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
## 2023-07-19 NOTE — PATIENT INSTRUCTIONS
1) I recommend you set an alarm to remind you to take your testosterone every day. Take is at around the same time every day.

## 2023-08-03 ENCOUNTER — HOSPITAL ENCOUNTER (OUTPATIENT)
Facility: HOSPITAL | Age: 53
Discharge: HOME | End: 2023-08-06
Attending: INTERNAL MEDICINE

## 2023-08-03 DIAGNOSIS — M81.8 OTHER OSTEOPOROSIS WITHOUT CURRENT PATHOLOGICAL FRACTURE: ICD-10-CM

## 2023-08-08 ENCOUNTER — HOSPITAL ENCOUNTER (OUTPATIENT)
Facility: HOSPITAL | Age: 53
Discharge: HOME | End: 2023-08-11
Attending: INTERNAL MEDICINE
Payer: MEDICARE

## 2023-08-08 PROCEDURE — 77080 DXA BONE DENSITY AXIAL: CPT

## 2023-08-31 ENCOUNTER — TELEPHONE (OUTPATIENT)
Age: 53
End: 2023-08-31

## 2023-09-12 RX ORDER — ALENDRONATE SODIUM 70 MG/1
TABLET ORAL
Qty: 12 TABLET | Refills: 3 | Status: SHIPPED | OUTPATIENT
Start: 2023-09-12

## 2023-09-12 NOTE — TELEPHONE ENCOUNTER
Copied from  [address] message: The name of the medication is called Alendronate (also called Fosamax). It is a pill you would take once every week, first thing in the morning by itself with a glass of water only. You would then wait an hour before you eat or drink anything other than water, or take any other medications. Patient called stating that he received the letter regarding the unread [employer] message. He would like the Fosamax Rx to go to [facility].

## 2023-11-01 DIAGNOSIS — E23.0 KALLMAN SYNDROME (HCC): ICD-10-CM

## 2023-11-01 DIAGNOSIS — E29.1 HYPOGONADISM IN MALE: ICD-10-CM

## 2023-11-07 LAB
ERYTHROCYTE [DISTWIDTH] IN BLOOD BY AUTOMATED COUNT: 14 % (ref 11.6–15.4)
HCT VFR BLD AUTO: 41.9 % (ref 37.5–51)
HGB BLD-MCNC: 13.3 G/DL (ref 13–17.7)
MCH RBC QN AUTO: 26.7 PG (ref 26.6–33)
MCHC RBC AUTO-ENTMCNC: 31.7 G/DL (ref 31.5–35.7)
MCV RBC AUTO: 84 FL (ref 79–97)
PLATELET # BLD AUTO: 189 X10E3/UL (ref 150–450)
RBC # BLD AUTO: 4.98 X10E6/UL (ref 4.14–5.8)
WBC # BLD AUTO: 5.2 X10E3/UL (ref 3.4–10.8)

## 2023-11-08 ENCOUNTER — OFFICE VISIT (OUTPATIENT)
Age: 53
End: 2023-11-08

## 2023-11-08 VITALS
SYSTOLIC BLOOD PRESSURE: 104 MMHG | BODY MASS INDEX: 35.07 KG/M2 | HEIGHT: 66 IN | DIASTOLIC BLOOD PRESSURE: 66 MMHG | WEIGHT: 218.2 LBS | HEART RATE: 85 BPM

## 2023-11-08 DIAGNOSIS — E23.0 KALLMAN SYNDROME (HCC): Primary | ICD-10-CM

## 2023-11-08 DIAGNOSIS — E29.1 HYPOGONADISM IN MALE: ICD-10-CM

## 2023-11-08 DIAGNOSIS — E55.9 VITAMIN D DEFICIENCY, UNSPECIFIED: ICD-10-CM

## 2023-11-08 DIAGNOSIS — M81.8 OTHER OSTEOPOROSIS WITHOUT CURRENT PATHOLOGICAL FRACTURE: ICD-10-CM

## 2023-11-08 LAB
TESTOST FREE SERPL-MCNC: 1.6 PG/ML (ref 7.2–24)
TESTOST SERPL-MCNC: 154.7 NG/DL (ref 264–916)

## 2023-11-08 RX ORDER — ALENDRONATE SODIUM 70 MG/1
TABLET ORAL
Qty: 12 TABLET | Refills: 3 | Status: SHIPPED | OUTPATIENT
Start: 2023-11-08

## 2023-11-08 RX ORDER — TESTOSTERONE 16.2 MG/G
GEL TRANSDERMAL
Qty: 225 G | Refills: 2 | Status: SHIPPED | OUTPATIENT
Start: 2023-11-08 | End: 2024-11-08

## 2023-11-08 NOTE — PROGRESS NOTES
Chief Complaint   Patient presents with    Hypogonadism    Osteoporosis     Pcp and pharmacy verified    vitamind d deficiency     History of Present Illness: Woody Torres is a 46 y.o. male here for follow up of hypognadism/Kallmann and osteoporosis. Pt notes he was on Testosterone till the age of 29 and then he stopped taking it. \"I had a doctor that said I did not have to take it any longer so I stopped. \"   He notes that in 2008 he took it for a while but \"a spot come up on my leg and I was concerned it could have been due to the testosterone so I stopped. At our initial visit in March 2021 he had not been on any testosterone since 2008, his testosterone level was 14, so we stared him on Testosterone 1.62%, 3 pumps per day    He was diagnosed with osteoporosis in February 2021. \"I had a bone density scan and it showed I had osteoporosis\". Pt notes that due to being off the Testosterone and because of the Zyprexa he has developed gynecomastia. He notes he developed this \"a long time ago, back in 2002. I have been dealing with mental illness since 1999 and the meds have caused to gynecomastia. \"    At our last visit in July 2023 his DXA showed a LFN T-Score of -3.3, LTH -2.9, L-Spine -3.4 and L-Radius -0.9. Since he was not taking his Testosterone on a regular basis we discussed that he needed to start taking his Testosterone daily before we started any anti-resorptive agents, but we did start him on Alendronate 70mg weekly. Pt denies any recent illnesses, injuries or hospitalizations. 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 \"    Pt notes he has not been taking his testosterone consistently. He will take the take 3 pumps on each arm about 3 days per week. His Testosterone last week was 154, his CBC was unremarkable. He reports he HAS been taking the Alendronate 70mg every week.   He notes he is taking the Alendronate 70mg

## 2023-11-09 ENCOUNTER — TELEPHONE (OUTPATIENT)
Age: 53
End: 2023-11-09

## 2023-11-09 NOTE — TELEPHONE ENCOUNTER
Prior Authorization not required for patient/medication   Case ID: [id number]      Payer:  [employer] Patient's Payer    [phone]    [facility] has predicted that this prior auth will not be required.    View History     Medication Being Authorized     Testosterone (ANDROGEL) 20.25 MG/ACT (1.62%) GEL gel

## 2024-01-15 ENCOUNTER — OFFICE VISIT (OUTPATIENT)
Age: 54
End: 2024-01-15
Payer: MEDICARE

## 2024-01-15 VITALS
RESPIRATION RATE: 17 BRPM | HEART RATE: 67 BPM | TEMPERATURE: 98 F | HEIGHT: 66 IN | DIASTOLIC BLOOD PRESSURE: 68 MMHG | OXYGEN SATURATION: 98 % | SYSTOLIC BLOOD PRESSURE: 102 MMHG | BODY MASS INDEX: 35.64 KG/M2 | WEIGHT: 221.78 LBS

## 2024-01-15 DIAGNOSIS — F22 DELUSION OF INFESTATION (HCC): ICD-10-CM

## 2024-01-15 DIAGNOSIS — F20.9 SCHIZOPHRENIA, UNSPECIFIED TYPE (HCC): ICD-10-CM

## 2024-01-15 DIAGNOSIS — K21.9 GASTROESOPHAGEAL REFLUX DISEASE, UNSPECIFIED WHETHER ESOPHAGITIS PRESENT: Primary | ICD-10-CM

## 2024-01-15 DIAGNOSIS — E23.0 KALLMAN SYNDROME (HCC): ICD-10-CM

## 2024-01-15 PROCEDURE — G8484 FLU IMMUNIZE NO ADMIN: HCPCS | Performed by: STUDENT IN AN ORGANIZED HEALTH CARE EDUCATION/TRAINING PROGRAM

## 2024-01-15 PROCEDURE — 99213 OFFICE O/P EST LOW 20 MIN: CPT | Performed by: STUDENT IN AN ORGANIZED HEALTH CARE EDUCATION/TRAINING PROGRAM

## 2024-01-15 PROCEDURE — G8427 DOCREV CUR MEDS BY ELIG CLIN: HCPCS | Performed by: STUDENT IN AN ORGANIZED HEALTH CARE EDUCATION/TRAINING PROGRAM

## 2024-01-15 PROCEDURE — 1036F TOBACCO NON-USER: CPT | Performed by: STUDENT IN AN ORGANIZED HEALTH CARE EDUCATION/TRAINING PROGRAM

## 2024-01-15 PROCEDURE — 3017F COLORECTAL CA SCREEN DOC REV: CPT | Performed by: STUDENT IN AN ORGANIZED HEALTH CARE EDUCATION/TRAINING PROGRAM

## 2024-01-15 PROCEDURE — G8417 CALC BMI ABV UP PARAM F/U: HCPCS | Performed by: STUDENT IN AN ORGANIZED HEALTH CARE EDUCATION/TRAINING PROGRAM

## 2024-01-15 RX ORDER — OMEPRAZOLE 40 MG/1
40 CAPSULE, DELAYED RELEASE ORAL DAILY
Qty: 90 CAPSULE | Refills: 3
Start: 2024-01-15

## 2024-01-15 ASSESSMENT — PATIENT HEALTH QUESTIONNAIRE - PHQ9
SUM OF ALL RESPONSES TO PHQ QUESTIONS 1-9: 0
1. LITTLE INTEREST OR PLEASURE IN DOING THINGS: 0
2. FEELING DOWN, DEPRESSED OR HOPELESS: 0
SUM OF ALL RESPONSES TO PHQ QUESTIONS 1-9: 0
SUM OF ALL RESPONSES TO PHQ9 QUESTIONS 1 & 2: 0

## 2024-01-15 NOTE — PROGRESS NOTES
Chief Complaint   Patient presents with    Follow-up     2023 Gastroesophageal reflux disease, unspecified whether esophagitis present     \"Have you been to the ER, urgent care clinic since your last visit?  Hospitalized since your last visit?\"    NO    “Have you seen or consulted any other health care providers outside of [facility] since your last visit?”      Yes  Dr. Salazar  2023  Endocrinology/ Manages Prediabetes             There were no vitals filed for this visit.  Health Maintenance Due   Topic Date Due    Hepatitis B vaccine (1 of 3 - 3-dose series) Never done    Hepatitis C screen  Never done    DTaP/Tdap/Td vaccine (1 - Tdap) Never done    Shingles vaccine (1 of 2) Never done    Flu vaccine (1) Never done    COVID-19 Vaccine (3 - 2023-24 season) 2023    A1C test (Diabetic or Prediabetic)  10/12/2023    Annual Wellness Visit (Medicare Advantage)  Never done      The patient, Lázaro COLLAZO Aaron, identity was verified by name and , pharmacy verified  Labs:Yes  Fasting:No

## 2024-01-15 NOTE — PROGRESS NOTES
[facility]  [address] [place] [address] [phone]    Chief Complaint: GERD    Subjective  Lázaro Walker is a 53 y.o. Black /  male , established patient, here for evaluation of the concern(s) above;    PMHx: GERD and  Schizophrenia    Patient states that he has had episodes where he feels like there are \"worms\" crawling on his bottoms. Never physically seen any worms. States that it feels so real. This is ongoing for several years. No blood in the stool. No changes in bowel movement. States that it seems like the worms are playing a trick and would go back \"inside\" when he looks at his anus.   On zyprexa for schizophrenia.  Follows with psych and has appointment tomorrow.     GERD -   Better with PPI.  No new concerns.  Recently had blood work done through endocrinology.        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     Physical Exam  /68 (Site: Left Upper Arm, Position: Sitting, Cuff Size: Medium Adult)   Pulse 67   Temp 98 °F (36.7 °C) (Tympanic)   Resp 17   Ht 1.676 m (5' 6\")   Wt 100.6 kg (221 lb 12.5 oz)   SpO2 98%   BMI 35.80 kg/m²     General: Alert and oriented, in no acute distress.  LUNGS: Respirations unlabored; clear to auscultation bilaterally.  CARDIOVASCULAR: Regular, rate, and rhythm without murmurs, gallops or rubs.  Neurological: Alert and oriented X 3, normal strength and tone. Normal symmetric reflexes. Normal coordination and gait     Assessment/Plan   Diagnosis Orders   1. Gastroesophageal reflux disease, unspecified whether esophagitis present

## 2024-03-01 DIAGNOSIS — E55.9 VITAMIN D DEFICIENCY, UNSPECIFIED: ICD-10-CM

## 2024-03-01 DIAGNOSIS — E29.1 HYPOGONADISM IN MALE: ICD-10-CM

## 2024-03-01 DIAGNOSIS — M81.8 OTHER OSTEOPOROSIS WITHOUT CURRENT PATHOLOGICAL FRACTURE: ICD-10-CM

## 2024-03-01 DIAGNOSIS — E23.0 KALLMAN SYNDROME (HCC): ICD-10-CM

## 2024-04-18 LAB
ERYTHROCYTE [DISTWIDTH] IN BLOOD BY AUTOMATED COUNT: 13.6 % (ref 11.6–15.4)
HCT VFR BLD AUTO: 41.2 % (ref 37.5–51)
HGB BLD-MCNC: 12.7 G/DL (ref 13–17.7)
MCH RBC QN AUTO: 26.8 PG (ref 26.6–33)
MCHC RBC AUTO-ENTMCNC: 30.8 G/DL (ref 31.5–35.7)
MCV RBC AUTO: 87 FL (ref 79–97)
PLATELET # BLD AUTO: 184 X10E3/UL (ref 150–450)
RBC # BLD AUTO: 4.73 X10E6/UL (ref 4.14–5.8)
WBC # BLD AUTO: 4.3 X10E3/UL (ref 3.4–10.8)

## 2024-04-19 LAB
25(OH)D3+25(OH)D2 SERPL-MCNC: 28.6 NG/ML (ref 30–100)
ALBUMIN SERPL-MCNC: 4.3 G/DL (ref 3.8–4.9)
BUN SERPL-MCNC: 10 MG/DL (ref 6–24)
BUN/CREAT SERPL: 11 (ref 9–20)
CALCIUM SERPL-MCNC: 9.2 MG/DL (ref 8.7–10.2)
CHLORIDE SERPL-SCNC: 103 MMOL/L (ref 96–106)
CO2 SERPL-SCNC: 21 MMOL/L (ref 20–29)
CREAT SERPL-MCNC: 0.94 MG/DL (ref 0.76–1.27)
EGFRCR SERPLBLD CKD-EPI 2021: 97 ML/MIN/1.73
GLUCOSE SERPL-MCNC: 123 MG/DL (ref 70–99)
PHOSPHATE SERPL-MCNC: 1.9 MG/DL (ref 2.8–4.1)
POTASSIUM SERPL-SCNC: 3.9 MMOL/L (ref 3.5–5.2)
PSA SERPL-MCNC: 0.3 NG/ML (ref 0–4)
SODIUM SERPL-SCNC: 142 MMOL/L (ref 134–144)

## 2024-04-22 LAB
TESTOST FREE SERPL-MCNC: 3.8 PG/ML (ref 7.2–24)
TESTOST SERPL-MCNC: 224.8 NG/DL (ref 264–916)

## 2024-04-24 ENCOUNTER — OFFICE VISIT (OUTPATIENT)
Age: 54
End: 2024-04-24
Payer: MEDICARE

## 2024-04-24 VITALS
DIASTOLIC BLOOD PRESSURE: 66 MMHG | BODY MASS INDEX: 34.55 KG/M2 | HEART RATE: 99 BPM | WEIGHT: 215 LBS | HEIGHT: 66 IN | SYSTOLIC BLOOD PRESSURE: 103 MMHG

## 2024-04-24 DIAGNOSIS — E23.0 KALLMAN SYNDROME (HCC): Primary | ICD-10-CM

## 2024-04-24 DIAGNOSIS — M81.8 OTHER OSTEOPOROSIS WITHOUT CURRENT PATHOLOGICAL FRACTURE: ICD-10-CM

## 2024-04-24 DIAGNOSIS — E55.9 VITAMIN D DEFICIENCY, UNSPECIFIED: ICD-10-CM

## 2024-04-24 DIAGNOSIS — R94.8 ABNORMAL RESULTS OF FUNCTION STUDIES OF OTHER ORGANS AND SYSTEMS: ICD-10-CM

## 2024-04-24 DIAGNOSIS — R73.03 PREDIABETES: ICD-10-CM

## 2024-04-24 PROCEDURE — G2211 COMPLEX E/M VISIT ADD ON: HCPCS | Performed by: INTERNAL MEDICINE

## 2024-04-24 PROCEDURE — G8427 DOCREV CUR MEDS BY ELIG CLIN: HCPCS | Performed by: INTERNAL MEDICINE

## 2024-04-24 PROCEDURE — 3017F COLORECTAL CA SCREEN DOC REV: CPT | Performed by: INTERNAL MEDICINE

## 2024-04-24 PROCEDURE — 99214 OFFICE O/P EST MOD 30 MIN: CPT | Performed by: INTERNAL MEDICINE

## 2024-04-24 PROCEDURE — 1036F TOBACCO NON-USER: CPT | Performed by: INTERNAL MEDICINE

## 2024-04-24 PROCEDURE — G8417 CALC BMI ABV UP PARAM F/U: HCPCS | Performed by: INTERNAL MEDICINE

## 2024-04-24 NOTE — PROGRESS NOTES
mL/min/1.73 97    BUN/Creatinine Ratio      9 - 20  11    Sodium      134 - 144 mmol/L 142    Potassium      3.5 - 5.2 mmol/L 3.9    Chloride      96 - 106 mmol/L 103    CARBON DIOXIDE      20 - 29 mmol/L 21    Calcium, Total      8.7 - 10.2 mg/dL 9.2    Phosphorus      2.8 - 4.1 mg/dL 1.9 (L)    Albumin      3.8 - 4.9 g/dL 4.3    WBC      3.4 - 10.8 x10E3/uL 4.3    RBC      4.14 - 5.80 x10E6/uL 4.73    Hemoglobin Quant      13.0 - 17.7 g/dL 12.7 (L)    Hematocrit      37.5 - 51.0 % 41.2    MCV      79 - 97 fL 87    MCH      26.6 - 33.0 pg 26.8    MCHC      31.5 - 35.7 g/dL 30.8 (L)    RDW      11.6 - 15.4 % 13.6    Platelet Count      150 - 450 x10E3/uL 184    Testosterone      264.0 - 916.0 ng/dL 224.8 (L)    Testosterone, Free Direct      7.2 - 24.0 pg/mL 3.8 (L)    Vit D, 25-Hydroxy      30.0 - 100.0 ng/mL 28.6 (L)    Prostatic Specific Ag      0.0 - 4.0 ng/mL 0.3      Assessment/Plan:   1) 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 Pt to set an alarm on his phone to remind him to take his testosterone at the same time every day.    2) Osteoporosis > He is taking the Alendronate 70mg every week. He is tolerating the Alendronate well with no side effects.   His next DXA is due in July 2025.  Pt to continue his Alendronate and his daily Ca and Vitamin D supplements.    3) Hypovitaminosis D > Pt to continue his Vitamin D every day. His Vitamin D level last week was 28.6.    4) Prediabetes > Pt given copy of \"Daily Diabetes Meal Planning Guide\" and educated about the \"[place] dinner plate\", reading food labels and which foods have the highest carbohydrates.  Pt instructed to limit her carbohydrates to 30-45 grams with meals and 15 grams or less with snacks (but to limit snacks).      Pt voices understanding and agreement with the plan.    RTC 6 months     Copy sent to:  Dr. Neil Arshad

## 2024-07-01 DIAGNOSIS — E23.0 KALLMAN SYNDROME (HCC): ICD-10-CM

## 2024-07-01 DIAGNOSIS — E29.1 HYPOGONADISM IN MALE: ICD-10-CM

## 2024-07-01 RX ORDER — TESTOSTERONE 16.2 MG/G
GEL TRANSDERMAL
Qty: 225 G | Refills: 2 | Status: SHIPPED | OUTPATIENT
Start: 2024-07-01 | End: 2024-07-02 | Stop reason: SDUPTHER

## 2024-07-01 NOTE — TELEPHONE ENCOUNTER
----- Message from Lázaro Walker sent at 6/29/2024 10:58 PM EDT -----  Regarding: Need a refill for testosterone gel  Contact: [phone]  Hi this is Lázaro Walker, bday 1970. I need a refill of Testosterone gel. I use [facility] pharmacy. If necessary i can be reached at [phone].    Thanks so much

## 2024-07-01 NOTE — TELEPHONE ENCOUNTER
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 Pt to set an alarm on his phone to remind him to take his testosterone at the same time every day.

## 2024-07-01 NOTE — TELEPHONE ENCOUNTER
----- Message from Lázaro Walker sent at 6/29/2024 10:58 PM EDT -----  Regarding: Need a refill for testosterone gel  Contact: [phone]  Hi this is Lázaro Walker, bday 1970. I need a refill of Testosterone gel. I use [facility] pharmacy. If necessary i can be reached at [phone].    Thanks so much

## 2024-07-02 DIAGNOSIS — E23.0 KALLMAN SYNDROME (HCC): ICD-10-CM

## 2024-07-02 DIAGNOSIS — E29.1 HYPOGONADISM IN MALE: ICD-10-CM

## 2024-07-02 RX ORDER — TESTOSTERONE 16.2 MG/G
GEL TRANSDERMAL
Qty: 225 G | Refills: 2 | Status: SHIPPED | OUTPATIENT
Start: 2024-07-02 | End: 2025-07-03

## 2024-07-02 NOTE — TELEPHONE ENCOUNTER
Patient called stating that he normally gets an email from [facility] stating they are processing a prescription. He did not receive a notification.  The Testosterone gel prescription sent yesterday does not have a confirmation of receipt. Advised will resend today.

## 2024-07-15 DIAGNOSIS — K21.9 GASTROESOPHAGEAL REFLUX DISEASE, UNSPECIFIED WHETHER ESOPHAGITIS PRESENT: ICD-10-CM

## 2024-07-16 RX ORDER — OMEPRAZOLE 40 MG/1
CAPSULE, DELAYED RELEASE ORAL
Qty: 90 CAPSULE | Refills: 3 | Status: SHIPPED | OUTPATIENT
Start: 2024-07-16

## 2024-07-16 NOTE — TELEPHONE ENCOUNTER
Last appointment: 1/15/24  Next appointment: 10/2/24  Previous refill encounter(s): 7/13/23    Requested Prescriptions     Pending Prescriptions Disp Refills    omeprazole (PRILOSEC) 40 MG delayed release capsule [Pharmacy Med Name: OMEPRAZOLE 40 MG Capsule Delayed Release] 90 capsule 3     Sig: TAKE 1 CAPSULE EVERY DAY FOR ACID REFLUX         For Pharmacy Admin Tracking Only    Program: Medication Refill  CPA in place:  Recommendation Provided To:   Intervention Detail: New Rx: 1, reason: Patient Preference  Intervention Accepted By: Gap Closed?:    Time Spent (min): 5

## 2024-07-17 ENCOUNTER — CLINICAL DOCUMENTATION (OUTPATIENT)
Age: 54
End: 2024-07-17

## 2024-07-18 ENCOUNTER — TELEPHONE (OUTPATIENT)
Age: 54
End: 2024-07-18

## 2024-07-18 NOTE — TELEPHONE ENCOUNTER
Chief Complaint   Patient presents with    Prior Authorization     Pt was called and made aware that his PA for the testosterone was approved.

## 2024-08-08 ENCOUNTER — TELEPHONE (OUTPATIENT)
Age: 54
End: 2024-08-08

## 2024-08-09 PROBLEM — E29.1 HYPOGONADISM IN MALE: Status: RESOLVED | Noted: 2021-03-23 | Resolved: 2024-08-09

## 2024-10-01 DIAGNOSIS — R94.8 ABNORMAL RESULTS OF FUNCTION STUDIES OF OTHER ORGANS AND SYSTEMS: ICD-10-CM

## 2024-10-01 DIAGNOSIS — R73.03 PREDIABETES: ICD-10-CM

## 2024-10-01 DIAGNOSIS — E55.9 VITAMIN D DEFICIENCY, UNSPECIFIED: ICD-10-CM

## 2024-10-01 DIAGNOSIS — E23.0 KALLMAN SYNDROME (HCC): ICD-10-CM

## 2024-10-01 DIAGNOSIS — M81.8 OTHER OSTEOPOROSIS WITHOUT CURRENT PATHOLOGICAL FRACTURE: ICD-10-CM

## 2024-10-02 ENCOUNTER — OFFICE VISIT (OUTPATIENT)
Age: 54
End: 2024-10-02
Payer: MEDICARE

## 2024-10-02 VITALS
HEART RATE: 73 BPM | DIASTOLIC BLOOD PRESSURE: 77 MMHG | OXYGEN SATURATION: 99 % | WEIGHT: 213.85 LBS | TEMPERATURE: 97.6 F | HEIGHT: 66 IN | BODY MASS INDEX: 34.37 KG/M2 | SYSTOLIC BLOOD PRESSURE: 118 MMHG | RESPIRATION RATE: 17 BRPM

## 2024-10-02 DIAGNOSIS — Z00.00 MEDICARE ANNUAL WELLNESS VISIT, SUBSEQUENT: Primary | ICD-10-CM

## 2024-10-02 DIAGNOSIS — Z23 COVID-19 VACCINE ADMINISTERED: ICD-10-CM

## 2024-10-02 DIAGNOSIS — Z23 NEEDS FLU SHOT: ICD-10-CM

## 2024-10-02 PROCEDURE — G8484 FLU IMMUNIZE NO ADMIN: HCPCS | Performed by: STUDENT IN AN ORGANIZED HEALTH CARE EDUCATION/TRAINING PROGRAM

## 2024-10-02 PROCEDURE — 3017F COLORECTAL CA SCREEN DOC REV: CPT | Performed by: STUDENT IN AN ORGANIZED HEALTH CARE EDUCATION/TRAINING PROGRAM

## 2024-10-02 PROCEDURE — G0008 ADMIN INFLUENZA VIRUS VAC: HCPCS | Performed by: STUDENT IN AN ORGANIZED HEALTH CARE EDUCATION/TRAINING PROGRAM

## 2024-10-02 PROCEDURE — PBSHW COVID-19, COMIRNATY, (AGE 12Y+), IM, PF, 30MCG/0.3ML: Performed by: STUDENT IN AN ORGANIZED HEALTH CARE EDUCATION/TRAINING PROGRAM

## 2024-10-02 PROCEDURE — G0439 PPPS, SUBSEQ VISIT: HCPCS | Performed by: STUDENT IN AN ORGANIZED HEALTH CARE EDUCATION/TRAINING PROGRAM

## 2024-10-02 PROCEDURE — PBSHW INFLUENZA, FLUCELVAX TRIVALENT, (AGE 6 MO+) IM, PRESERVATIVE FREE, 0.5ML: Performed by: STUDENT IN AN ORGANIZED HEALTH CARE EDUCATION/TRAINING PROGRAM

## 2024-10-02 PROCEDURE — 90471 IMMUNIZATION ADMIN: CPT | Performed by: STUDENT IN AN ORGANIZED HEALTH CARE EDUCATION/TRAINING PROGRAM

## 2024-10-02 ASSESSMENT — LIFESTYLE VARIABLES
HOW MANY STANDARD DRINKS CONTAINING ALCOHOL DO YOU HAVE ON A TYPICAL DAY: PATIENT DOES NOT DRINK
HOW OFTEN DO YOU HAVE A DRINK CONTAINING ALCOHOL: NEVER

## 2024-10-02 ASSESSMENT — PATIENT HEALTH QUESTIONNAIRE - PHQ9
SUM OF ALL RESPONSES TO PHQ9 QUESTIONS 1 & 2: 0
SUM OF ALL RESPONSES TO PHQ QUESTIONS 1-9: 0
1. LITTLE INTEREST OR PLEASURE IN DOING THINGS: NOT AT ALL
SUM OF ALL RESPONSES TO PHQ QUESTIONS 1-9: 0
2. FEELING DOWN, DEPRESSED OR HOPELESS: NOT AT ALL

## 2024-10-02 NOTE — PROGRESS NOTES
Chief Complaint   Patient presents with    6 Month Follow-Up     1/15/2024 Gastroesophageal reflux disease, unspecified whether esophagitis present    Medicare AWV       \"Have you been to the ER, urgent care clinic since your last visit?  Hospitalized since your last visit?\"      Yes  Scot Hardwick  2024    “Have you seen or consulted any other health care providers outside of [facility] since your last visit?”      Yes  [facility]  NP-Gardenia Wilson [facility]    Vitals:    10/02/24 1151   BP: 118/77   Pulse: 73   Resp: 17   Temp: 97.6 °F (36.4 °C)   SpO2: 99%      Health Maintenance Due   Topic Date Due    Hepatitis C screen  Never done    Hepatitis B vaccine (1 of 3 - 19+ 3-dose series) Never done    DTaP/Tdap/Td vaccine (1 - Tdap) Never done    Shingles vaccine (1 of 2) Never done    A1C test (Diabetic or Prediabetic)  10/12/2023    Annual Wellness Visit (Medicare Advantage)  Never done        The patient, Lázaro Walker, identity was verified by name and .       After obtaining consent, and per orders of Dr. Arshad, injection of   COVID-19, COMIRNATY,    given in Left deltoid by and   Influenza, FLUCELVAX T right deltoid    ALIN MCCOY MA. Patient instructed to remain in clinic for 20 minutes afterwards, and to report any adverse reaction to me immediately.   
  Objective   Vitals:    10/02/24 1151   BP: 118/77   Pulse: 73   Resp: 17   Temp: 97.6 °F (36.4 °C)   TempSrc: Temporal   SpO2: 99%   Weight: 97 kg (213 lb 13.5 oz)   Height: 1.676 m (5' 6\")      Body mass index is 34.52 kg/m².        General: Alert and oriented, in no acute distress.  LUNGS: Respirations unlabored; clear to auscultation bilaterally.  CARDIOVASCULAR: Regular, rate, and rhythm without murmurs, gallops or rubs.  Neurological: Alert and oriented X 3, normal strength and tone. Normal symmetric reflexes. Normal coordination and gait            No Known Allergies  Prior to Visit Medications    Medication Sig Taking? Authorizing Provider   omeprazole (PRILOSEC) 40 MG delayed release capsule TAKE 1 CAPSULE EVERY DAY FOR ACID REFLUX Yes Neil Arshad MD   Testosterone (ANDROGEL) 20.25 MG/ACT (1.62%) GEL gel 3 pumps to each shoulder daily (6 pumps daily) Yes Chato Salazar MD   alendronate (FOSAMAX) 70 MG tablet Take with a full glass of water upon arising for the day. Consume on an empty stomach at least 30 minutes before the first food, beverage, or medication. Stay upright (do not lie down) for at least 30 minutes. Do not crush or break. Yes Chato Salazar MD   calcium-vitamin D (OSCAL-500) 500-5 MG-MCG TABS per tablet Take 1 tablet by mouth daily Yes ProviderBest MD   OLANZapine (ZYPREXA) 20 MG tablet Take 1 tablet by mouth nightly Yes Automatic Reconciliation, Ar       CareTeam (Including outside providers/suppliers regularly involved in providing care):   Patient Care Team:  Neil Arshad MD as PCP - General  Neil Arshad MD as PCP - Empaneled Provider  Chato Salazar MD as Consulting Physician  Maria Del Rosario Chavez RN as Ambulatory Care Manager      Reviewed and updated this visit:  Tobacco  Allergies  Meds  Med Hx  Surg Hx  Soc Hx  Fam Hx

## 2024-10-18 LAB
25(OH)D3+25(OH)D2 SERPL-MCNC: 34.1 NG/ML (ref 30–100)
ALBUMIN SERPL-MCNC: 4.3 G/DL (ref 3.8–4.9)
ALP SERPL-CCNC: 63 IU/L (ref 44–121)
ALT SERPL-CCNC: 14 IU/L (ref 0–44)
AST SERPL-CCNC: 15 IU/L (ref 0–40)
BILIRUB SERPL-MCNC: <0.2 MG/DL (ref 0–1.2)
BUN SERPL-MCNC: 8 MG/DL (ref 6–24)
BUN/CREAT SERPL: 8 (ref 9–20)
CALCIUM SERPL-MCNC: 9.4 MG/DL (ref 8.7–10.2)
CHLORIDE SERPL-SCNC: 101 MMOL/L (ref 96–106)
CO2 SERPL-SCNC: 24 MMOL/L (ref 20–29)
CREAT SERPL-MCNC: 1.02 MG/DL (ref 0.76–1.27)
EGFRCR SERPLBLD CKD-EPI 2021: 88 ML/MIN/1.73
ERYTHROCYTE [DISTWIDTH] IN BLOOD BY AUTOMATED COUNT: 13.2 % (ref 11.6–15.4)
GLOBULIN SER CALC-MCNC: 3.2 G/DL (ref 1.5–4.5)
GLUCOSE SERPL-MCNC: 123 MG/DL (ref 70–99)
HBA1C MFR BLD: 7 % (ref 4.8–5.6)
HCT VFR BLD AUTO: 40.1 % (ref 37.5–51)
HGB BLD-MCNC: 12.4 G/DL (ref 13–17.7)
MCH RBC QN AUTO: 26.3 PG (ref 26.6–33)
MCHC RBC AUTO-ENTMCNC: 30.9 G/DL (ref 31.5–35.7)
MCV RBC AUTO: 85 FL (ref 79–97)
PLATELET # BLD AUTO: 208 X10E3/UL (ref 150–450)
POTASSIUM SERPL-SCNC: 4 MMOL/L (ref 3.5–5.2)
PROT SERPL-MCNC: 7.5 G/DL (ref 6–8.5)
PSA SERPL-MCNC: 0.3 NG/ML (ref 0–4)
RBC # BLD AUTO: 4.71 X10E6/UL (ref 4.14–5.8)
SODIUM SERPL-SCNC: 141 MMOL/L (ref 134–144)
WBC # BLD AUTO: 4.5 X10E3/UL (ref 3.4–10.8)

## 2024-10-23 ENCOUNTER — OFFICE VISIT (OUTPATIENT)
Age: 54
End: 2024-10-23
Payer: MEDICARE

## 2024-10-23 VITALS
BODY MASS INDEX: 34.62 KG/M2 | HEART RATE: 87 BPM | DIASTOLIC BLOOD PRESSURE: 74 MMHG | HEIGHT: 66 IN | SYSTOLIC BLOOD PRESSURE: 110 MMHG | WEIGHT: 215.4 LBS

## 2024-10-23 DIAGNOSIS — E11.65 TYPE 2 DIABETES MELLITUS WITH HYPERGLYCEMIA, WITHOUT LONG-TERM CURRENT USE OF INSULIN (HCC): ICD-10-CM

## 2024-10-23 DIAGNOSIS — R94.8 ABNORMAL RESULTS OF FUNCTION STUDIES OF OTHER ORGANS AND SYSTEMS: ICD-10-CM

## 2024-10-23 DIAGNOSIS — M81.8 OTHER OSTEOPOROSIS WITHOUT CURRENT PATHOLOGICAL FRACTURE: ICD-10-CM

## 2024-10-23 DIAGNOSIS — E29.1 HYPOGONADISM IN MALE: ICD-10-CM

## 2024-10-23 DIAGNOSIS — E23.0 KALLMAN SYNDROME (HCC): Primary | ICD-10-CM

## 2024-10-23 DIAGNOSIS — E55.9 VITAMIN D DEFICIENCY, UNSPECIFIED: ICD-10-CM

## 2024-10-23 LAB
TESTOST FREE SERPL-MCNC: 3.2 PG/ML (ref 7.2–24)
TESTOST SERPL-MCNC: 240.9 NG/DL (ref 264–916)

## 2024-10-23 PROCEDURE — 3017F COLORECTAL CA SCREEN DOC REV: CPT | Performed by: INTERNAL MEDICINE

## 2024-10-23 PROCEDURE — 99214 OFFICE O/P EST MOD 30 MIN: CPT | Performed by: INTERNAL MEDICINE

## 2024-10-23 PROCEDURE — G2211 COMPLEX E/M VISIT ADD ON: HCPCS | Performed by: INTERNAL MEDICINE

## 2024-10-23 PROCEDURE — G8484 FLU IMMUNIZE NO ADMIN: HCPCS | Performed by: INTERNAL MEDICINE

## 2024-10-23 PROCEDURE — G8427 DOCREV CUR MEDS BY ELIG CLIN: HCPCS | Performed by: INTERNAL MEDICINE

## 2024-10-23 PROCEDURE — 2022F DILAT RTA XM EVC RTNOPTHY: CPT | Performed by: INTERNAL MEDICINE

## 2024-10-23 PROCEDURE — 3051F HG A1C>EQUAL 7.0%<8.0%: CPT | Performed by: INTERNAL MEDICINE

## 2024-10-23 PROCEDURE — G8417 CALC BMI ABV UP PARAM F/U: HCPCS | Performed by: INTERNAL MEDICINE

## 2024-10-23 PROCEDURE — 1036F TOBACCO NON-USER: CPT | Performed by: INTERNAL MEDICINE

## 2024-10-23 NOTE — PATIENT INSTRUCTIONS
Metformin 500mg. Take one tablet with dinner for one week, then take one tablet with breakfast and one tablet with dinner.

## 2024-10-23 NOTE — PROGRESS NOTES
Chief Complaint   Patient presents with    Kallman Syndrome     Pcp and pharmacy verified    Osteoporosis    Hypogonadism     History of Present Illness: Lázaro Walker is a 53 y.o. male here for follow up of hypognadism/Kallmann and osteoporosis.  Pt notes he was on Testosterone till the age of 28 and then he stopped taking it. \"I had a doctor that said I did not have to take it any longer so I stopped.\"   He notes that in 2008 he took it for a while but \"a spot come up on my leg and I was concerned it could have been due to the testosterone so I stopped.  At our initial visit in March 2021 he had not been on any testosterone since 2008, his testosterone level was 14, so we stared him on Testosterone 1.62%, 3 pumps per day    He was diagnosed with osteoporosis in February 2021. \"I had a bone density scan and it showed I had osteoporosis\".  Pt notes that due to being off the Testosterone and because of the Zyprexa he has developed gynecomastia. He notes he developed this \"a long time ago, back in 2002. I have been dealing with mental illness since 1999 and the meds have caused to gynecomastia.\"    At our visit in July 2023 his DXA showed a LFN T-Score of -3.3, LTH -2.9, L-Spine -3.4 and L-Radius -0.9.  Since he was not taking his Testosterone on a regular basis we discussed that he needed to start taking his Testosterone daily before we started any anti-resorptive agents, but we did start him on Alendronate 70mg weekly.    Pt denies any recent illnesses, injuries or hospitalizations.    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 He will take the take 3 pumps on each arm about 3-4 days per week.    His PSA was 0.3 and his CBC was unremarkable.  He reports he HAS been taking the Alendronate 70mg every week.    He denies issues of CP, SOB, he denies any issues of mood swings

## 2024-11-21 ENCOUNTER — OFFICE VISIT (OUTPATIENT)
Age: 54
End: 2024-11-21
Payer: MEDICARE

## 2024-11-21 DIAGNOSIS — E11.65 TYPE 2 DIABETES MELLITUS WITH HYPERGLYCEMIA, WITHOUT LONG-TERM CURRENT USE OF INSULIN (HCC): Primary | ICD-10-CM

## 2024-11-21 PROCEDURE — G0108 DIAB MANAGE TRN  PER INDIV: HCPCS

## 2024-11-21 NOTE — PROGRESS NOTES
medication or insulin doses are correct. 2   5. I know what to do to prevent a low blood sugar when I exercise (either before, during, or after). 2   6. When I am sick, I know what to do differently with my diabetes management. 2   7. I know how stress can affect my diabetes management. 2   8. When I look at my blood sugars over a given week, I can explain what my blood sugar pattern is. 2   9. I know what my target levels are for A1c, blood pressure and cholesterol. 2   Confidence Questions   1. I am confident that I can plan balanced meals and snacks. 6   2. I am confident that I can manage my stress. 6   3. I am confident that I can prevent a low blood sugar during or after exercise. 6   4. I am confident that the next time I eat out, I will be able to choose foods that best keep my blood sugars in target. 6   5. I am confident I can include exercise into my schedule. 6   6. I am confident that I can use my daily blood sugars to adjust my diet, my activity, and/or my insulin. 2   7. When something out of my normal routine happens, I am confident that I can problem-solve and keep my diabetes on track. 6   Preparedness Questions   1. Within the next month, I will begin to eat more balanced meals and snacks. 8   2. Within the next month, I will choose an exercise activity and I will start fitting it into my schedule. 6   3. Within the next month, I will make a list of stress management options that work for me. 6   4. Within the next month, I will consistently plan ahead to prevent low blood sugars. 6   5. Within the next month, I will start adjusting my insulin doses on my own. 0   6. Within the next month, I will begin making changes to my diabetes management based on my daily blood sugars (eg - eating, activity and/or insulin). 6   7. Within the next month, I will begin making changes to my diabetes management to meet my overall goals (eg - eating, activity and/or insulin). 6

## 2024-11-26 NOTE — BH NOTES
PSYCHOSOCIAL ASSESSMENT  :Patient identifying info:  Johanna Ni is a 52 y.o., male admitted 6/11/2018  8:09 PM     Presenting problem and precipitating factors: Pt admitted on TDO by [facility] after presenting delusional, paranoid and perseverative while being released from prison. Pt was incarcerated for property damage and trespassing after being found living in a storage unit. Mental status assessment: Social Work-Pt was seen in treatment team this morning. Pt is alert and oriented. Pt denies SI/HI. Pt's mood is labile, affect is constricted. Pt's thought process is illogical, tangential and with loose associations. Pt's insight poor and judgment is poor, reliability is poor. Pt signed CRI to speak with his mother Elzbieta David [phone] but line is busy and also with his uncle, Seble Gifford [phone]. Pt was involuntarily committed by courts. Pt's anticipated length of stay is 3 days. Social work department will continue to coordinate discharge plans. Current psychiatric providers and contact info: Pt denied any current psychiatric providers. Previous psychiatric services/providers and response to treatment: Pt reported receiving mental health services when living in [place] and he revealed he had MOT. Family history of mental illness : Unknown at this time. Substance abuse history: Unknown at this time. Social History   Substance Use Topics    Smoking status: Not on file    Smokeless tobacco: Not on file    Alcohol use Not on file       Family constellation: Pt's mother and uncle are alive and he reports they are supportive. Is significant other involved? Pt is single. Describe support system: Pt reported his mother is supportive but will not be involved in treatment. Describe living arrangements and home environment: Unknown at this time. Health issues: Unknown at this time.    Hospital Problems  Never Reviewed          Codes Class Noted POA    * (Principal)Schizophrenia [facility]) ICD-10-CM: F20.9  ICD-9-CM: 295.90  2018 Unknown              Trauma history: Unknown at this time. Legal issues: Pt has a pending legal charges from trespassing and property damage. History of  service: Pt denied  service. Financial status: Pt has no income. Pt receives Medicare benefits. Christian/cultural factors: Pt reported being \"Nondenominational\" but no cultural factors. Education/work history: Unknown at this time. 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 Leisure and recreation preferences: Unknown at this time.     Describe coping skills: Pt's coping skills are ineffectual.     Tonya Sargent  2018 Walk in

## 2024-12-23 DIAGNOSIS — M81.8 OTHER OSTEOPOROSIS WITHOUT CURRENT PATHOLOGICAL FRACTURE: Primary | ICD-10-CM

## 2024-12-23 RX ORDER — ALENDRONATE SODIUM 70 MG
TABLET ORAL
Qty: 12 TABLET | Refills: 3 | Status: SHIPPED | OUTPATIENT
Start: 2024-12-23

## 2025-01-06 DIAGNOSIS — M81.8 OTHER OSTEOPOROSIS WITHOUT CURRENT PATHOLOGICAL FRACTURE: ICD-10-CM

## 2025-01-06 RX ORDER — ALENDRONATE SODIUM 70 MG/1
70 TABLET ORAL
Qty: 13 TABLET | Refills: 3 | OUTPATIENT
Start: 2025-01-06

## 2025-01-06 RX ORDER — ALENDRONATE SODIUM 70 MG/1
70 TABLET ORAL
Qty: 12 TABLET | Refills: 1 | Status: SHIPPED | OUTPATIENT
Start: 2025-01-06

## 2025-01-06 NOTE — TELEPHONE ENCOUNTER
Hi almost 2 weeks ago I called for a refill of Aledronate. Forsamax was prescribed which is not covered by my insurance and [facility] put the order on hold. Could you please refill the Alendronate 70 mg tablet. Thanks so much and call if you need more info [phone]

## 2025-01-15 ENCOUNTER — OFFICE VISIT (OUTPATIENT)
Age: 55
End: 2025-01-15
Payer: MEDICARE

## 2025-01-15 DIAGNOSIS — E11.65 TYPE 2 DIABETES MELLITUS WITH HYPERGLYCEMIA, WITHOUT LONG-TERM CURRENT USE OF INSULIN (HCC): Primary | ICD-10-CM

## 2025-01-15 PROCEDURE — G0108 DIAB MANAGE TRN  PER INDIV: HCPCS

## 2025-01-15 NOTE — PROGRESS NOTES
[facility]  Diabetes Self-Management Education & Support Program  Encounter Note      SUMMARY  Diabetes self-care management training was completed related to what is diabetes? And reducing risks. The participant will return on February 12 to continue DSMES related to reducing risks and healthy eating. The participant did identify SMART Goal(s) and will practice knowledge and skills related to reducing risks to improve diabetes self-management.        EVALUATION:  Mr Walker expressed understanding of T 2 DM disease process and the ADA targets for BG & A1c. He is not currently checking BG but plans to start.  Lázaro expressed understanding of the role vaccines, dilated eye, dental & foot exams play in preventing diabetes complications. He plans to schedule dilated eye exam. He started a personal diabetes care record today.    RECOMMENDATIONS:  Obtain glucometer and start checking FBG 2-3 times a week.  Schedule dilated eye exam.  Continue DSMES.     TOPICS DISCUSSED TODAY:  WHAT IS DIABETES? Minutes: 30  HOW DO I STAY HEALTHY? 30      Next provider visit is scheduled for 2/26/25       SMART GOAL(S)   Obtain glucometer & supplies ( goal made 1/15/25)  ACHIEVEMENT OF GOAL(S) : 0-24%    2.    Schedule dilated eye exam  ( goal made 1/15/25)  ACHIEVEMENT OF GOAL(S) :  0-24%             DATE DSMES TOPIC EVALUATION     1/15/2025 WHAT IS DIABETES?   Role of the normal pancreas in energy balance and blood glucose control   The defect seen in diabetes   Signs & symptoms of diabetes   Diagnosis of diabetes   Types of diabetes   Blood glucose targets in non-pregnant & non-pregnant adults       The participant knows  Their type of diabetes: Yes   The basic physiologic defect: Yes  Blood glucose targets: Yes     DATE DSMES TOPIC EVALUATION     1/15/2025 HOW DO I STAY HEALTHY?   Prevention   Vaccinations   Preconception care (if applicable)  Examinations   Eye    Foot   Diabetic complications'

## 2025-02-01 DIAGNOSIS — E55.9 VITAMIN D DEFICIENCY, UNSPECIFIED: ICD-10-CM

## 2025-02-01 DIAGNOSIS — E23.0 KALLMAN SYNDROME (HCC): ICD-10-CM

## 2025-02-01 DIAGNOSIS — E11.65 TYPE 2 DIABETES MELLITUS WITH HYPERGLYCEMIA, WITHOUT LONG-TERM CURRENT USE OF INSULIN (HCC): ICD-10-CM

## 2025-02-01 DIAGNOSIS — R94.8 ABNORMAL RESULTS OF FUNCTION STUDIES OF OTHER ORGANS AND SYSTEMS: ICD-10-CM

## 2025-02-01 DIAGNOSIS — E29.1 HYPOGONADISM IN MALE: ICD-10-CM

## 2025-02-24 LAB
ERYTHROCYTE [DISTWIDTH] IN BLOOD BY AUTOMATED COUNT: 13.1 % (ref 11.6–15.4)
HCT VFR BLD AUTO: 41.4 % (ref 37.5–51)
HGB BLD-MCNC: 13.1 G/DL (ref 13–17.7)
MCH RBC QN AUTO: 27.2 PG (ref 26.6–33)
MCHC RBC AUTO-ENTMCNC: 31.6 G/DL (ref 31.5–35.7)
MCV RBC AUTO: 86 FL (ref 79–97)
PLATELET # BLD AUTO: 190 X10E3/UL (ref 150–450)
RBC # BLD AUTO: 4.81 X10E6/UL (ref 4.14–5.8)
WBC # BLD AUTO: 5 X10E3/UL (ref 3.4–10.8)

## 2025-02-25 LAB
25(OH)D3+25(OH)D2 SERPL-MCNC: 31.6 NG/ML (ref 30–100)
ALBUMIN SERPL-MCNC: 4.5 G/DL (ref 3.8–4.9)
ALP SERPL-CCNC: 66 IU/L (ref 44–121)
ALT SERPL-CCNC: 43 IU/L (ref 0–44)
AST SERPL-CCNC: 120 IU/L (ref 0–40)
BILIRUB SERPL-MCNC: 0.3 MG/DL (ref 0–1.2)
BUN SERPL-MCNC: 11 MG/DL (ref 6–24)
BUN/CREAT SERPL: 10 (ref 9–20)
CALCIUM SERPL-MCNC: 9.5 MG/DL (ref 8.7–10.2)
CHLORIDE SERPL-SCNC: 100 MMOL/L (ref 96–106)
CO2 SERPL-SCNC: 24 MMOL/L (ref 20–29)
CREAT SERPL-MCNC: 1.13 MG/DL (ref 0.76–1.27)
EGFRCR SERPLBLD CKD-EPI 2021: 77 ML/MIN/1.73
GLOBULIN SER CALC-MCNC: 2.9 G/DL (ref 1.5–4.5)
GLUCOSE SERPL-MCNC: 108 MG/DL (ref 70–99)
HBA1C MFR BLD: 6.3 % (ref 4.8–5.6)
POTASSIUM SERPL-SCNC: 3.9 MMOL/L (ref 3.5–5.2)
PROT SERPL-MCNC: 7.4 G/DL (ref 6–8.5)
PSA SERPL-MCNC: 0.3 NG/ML (ref 0–4)
SODIUM SERPL-SCNC: 139 MMOL/L (ref 134–144)

## 2025-02-26 ENCOUNTER — OFFICE VISIT (OUTPATIENT)
Age: 55
End: 2025-02-26

## 2025-02-26 VITALS
SYSTOLIC BLOOD PRESSURE: 110 MMHG | HEIGHT: 66 IN | WEIGHT: 208.2 LBS | HEART RATE: 98 BPM | BODY MASS INDEX: 33.46 KG/M2 | DIASTOLIC BLOOD PRESSURE: 72 MMHG

## 2025-02-26 DIAGNOSIS — R94.8 ABNORMAL RESULTS OF FUNCTION STUDIES OF OTHER ORGANS AND SYSTEMS: ICD-10-CM

## 2025-02-26 DIAGNOSIS — E11.65 TYPE 2 DIABETES MELLITUS WITH HYPERGLYCEMIA, WITHOUT LONG-TERM CURRENT USE OF INSULIN (HCC): ICD-10-CM

## 2025-02-26 DIAGNOSIS — M81.8 OTHER OSTEOPOROSIS WITHOUT CURRENT PATHOLOGICAL FRACTURE: Primary | ICD-10-CM

## 2025-02-26 DIAGNOSIS — E23.0 KALLMAN SYNDROME: ICD-10-CM

## 2025-02-26 DIAGNOSIS — E55.9 VITAMIN D DEFICIENCY, UNSPECIFIED: ICD-10-CM

## 2025-02-26 DIAGNOSIS — E29.1 HYPOGONADISM IN MALE: ICD-10-CM

## 2025-02-26 LAB
ALBUMIN/CREAT UR: 21 MG/G CREAT (ref 0–29)
CREAT UR-MCNC: 192.1 MG/DL
Lab: NORMAL
MICROALBUMIN UR-MCNC: 39.7 UG/ML

## 2025-02-26 RX ORDER — TESTOSTERONE 1.62 MG/G
GEL TRANSDERMAL
Qty: 225 G | Refills: 2
Start: 2025-02-26 | End: 2026-02-27

## 2025-02-26 NOTE — PROGRESS NOTES
Chief Complaint   Patient presents with    Kallman Syndrome    Hypogonadism    Osteoporosis     Pcp and pharmacy verified    Diabetes     History of Present Illness: Lázaro Walker is a 54 y.o. male here for follow up of hypognadism/Kallmann and osteoporosis.  Pt notes he was on Testosterone till the age of 28 and then he stopped taking it. \"I had a doctor that said I did not have to take it any longer so I stopped.\"   He notes that in 2008 he took it for a while but \"a spot come up on my leg and I was concerned it could have been due to the testosterone so I stopped.  At our initial visit in March 2021 he had not been on any testosterone since 2008, his testosterone level was 14, so we stared him on Testosterone 1.62%, 3 pumps per day    He was diagnosed with osteoporosis in February 2021. \"I had a bone density scan and it showed I had osteoporosis\".  Pt notes that due to being off the Testosterone and because of the Zyprexa he has developed gynecomastia. He notes he developed this \"a long time ago, back in 2002. I have been dealing with mental illness since 1999 and the meds have caused to gynecomastia.\"    At our visit in July 2023 his DXA showed a LFN T-Score of -3.3, LTH -2.9, L-Spine -3.4 and L-Radius -0.9.  Since he was not taking his Testosterone on a regular basis we discussed that he needed to start taking his Testosterone daily before we started any anti-resorptive agents, but we did start him on Alendronate 70mg weekly.    At our last visit in October 2024 his A1C was 7.0% and since he was now diabetic I started him on Metformin 500mg BID and we discussed the need for dietary and lifestyle changes.    Pt denies any recent illnesses, injuries or hospitalizations.    Pt reports he has been taking the Testosterone 3 pumps to each shoulder daily \"about 3-4 days per week\".  Pt has been taking the Metformin 500mg BID.  He reports he HAS been taking the Alendronate 70mg every week.    He denies issues of CP,

## 2025-03-01 LAB
TESTOST FREE SERPL-MCNC: 3.2 PG/ML (ref 7.2–24)
TESTOST SERPL-MCNC: 174 NG/DL (ref 264–916)

## 2025-04-09 ENCOUNTER — OFFICE VISIT (OUTPATIENT)
Age: 55
End: 2025-04-09
Payer: MEDICARE

## 2025-04-09 DIAGNOSIS — E11.65 TYPE 2 DIABETES MELLITUS WITH HYPERGLYCEMIA, WITHOUT LONG-TERM CURRENT USE OF INSULIN (HCC): Primary | ICD-10-CM

## 2025-04-09 PROCEDURE — G0108 DIAB MANAGE TRN  PER INDIV: HCPCS

## 2025-04-09 NOTE — PROGRESS NOTES
[facility]  Diabetes Self-Management Education & Support Program  Encounter Note      SUMMARY  Diabetes self-care management training was completed related to healthy eating. The participant will return on May 14 to continue DSMES related to reducing risks and healthy eating. The participant did not identify SMART Goal(s) and will practice knowledge and skills related to healthy eating to improve diabetes self-management.        EVALUATION:  Mr Walker expressed understanding of using healthy plate to build balanced , CHO and portion controlled meals. We reviewed reading nutrition facts labels to make more informed food choices. He was provided ADA handouts, best food & snacks for people with diabetes.   Lázaro is not yet checking BG but plans to start.    RECOMMENDATIONS:  Start checking BG.  Practice reading nutrition facts labels & counting CHOs.  Check out Diabetes [employer] for diabetes friendly recipes.  Schedule dilated eye exam. ( Gave info on VEI)    TOPICS DISCUSSED TODAY:  WHAT CAN I EAT? 60      Next provider visit is scheduled for 5/14/26       SMART GOAL(S)   Obtain glucometer & supplies  ACHIEVEMENT OF GOAL(S) : 0-24%    2.    Schedule dilated eye exam  ACHIEVEMENT OF GOAL(S) :  0-24%       DATE DSMES TOPIC EVALUATION     4/9/2025 WHAT CAN I EAT?   General principles   Determining a healthy weight   Nutritional terms & tools   Healthy Plate method   Carbohydrate Counting   Reading food labels   Free apps   Pregnancy recommendations      The participant   Uses Healthy Plate principles in constructing meals: Yes  Reads food labels in choosing acceptable foods: Yes           COREEN CABRERA RN on 4/9/2025 at 2:06 PM    I have personally reviewed the health record, including provider notes, laboratory data and current medications before making these care and education recommendations. The time spent in this effort is included in the total time.  Total minutes: 60

## 2025-05-14 ENCOUNTER — OFFICE VISIT (OUTPATIENT)
Age: 55
End: 2025-05-14
Payer: MEDICARE

## 2025-05-14 DIAGNOSIS — E11.65 TYPE 2 DIABETES MELLITUS WITH HYPERGLYCEMIA, WITHOUT LONG-TERM CURRENT USE OF INSULIN (HCC): Primary | ICD-10-CM

## 2025-05-14 PROCEDURE — G0108 DIAB MANAGE TRN  PER INDIV: HCPCS

## 2025-05-14 NOTE — PROGRESS NOTES
[facility]  Diabetes Self-Management Education & Support Program  Encounter Note      SUMMARY  Diabetes self-care management training was completed related to monitoring. The participant will return on June 12 to continue DSMES related to taking medications and physical activity. The participant did not identify SMART Goal(s) and will practice knowledge and skills related to reducing risks and healthy eating and monitoring to improve diabetes self-management.        EVALUATION:  Mr Walker expressed understanding of the role BG monitoring plays in diabetes self management including impact of food type & amount on BG and the impact of exercise, medications, illness & stress. He shared is having trouble working his glucometer. We reviewed proper technique, advised to bring glucometer to next session.  Lázaro shared had an eye exam.    RECOMMENDATIONS:  Continue DSMES.     TOPICS DISCUSSED TODAY:  HOW CAN BLOOD GLUCOSE MONITORING HELP ME? 30      Next provider visit is scheduled for 9/2/25       SMART GOAL(S)   Obtain glucometer & supplies ( goal met)  ACHIEVEMENT OF GOAL(S) : %    2.    Schedule dilated eye exam ( goal met)  ACHIEVEMENT OF GOAL(S) :  %       DATE DSMES TOPIC EVALUATION     5/14/2025 HOW CAN BLOOD GLUCOSE MONITORING HELP ME?   Value of blood glucose monitoring   Realistic expectations   Blood glucose monitoring targets   Target adjustments   Setting a1c & blood glucose targets with provider   Meter selection    Technique for obtaining blood droplet   Blood glucose testing sites   Determining best times to test   Pregnancy recommendations   Data sharing with provider        The participant   Can demonstrate their glucometer procedure: No, needs assist       COREEN CABRERA RN on 5/14/2025 at 1:49 PM    I have personally reviewed the health record, including provider notes, laboratory data and current medications before making these care and education

## 2025-05-28 PROBLEM — E55.9 HYPOVITAMINOSIS D: Status: ACTIVE | Noted: 2025-05-28

## 2025-05-28 PROBLEM — E11.9 TYPE 2 DIABETES MELLITUS WITHOUT COMPLICATION, WITHOUT LONG-TERM CURRENT USE OF INSULIN (HCC): Status: ACTIVE | Noted: 2025-05-28

## 2025-05-30 ENCOUNTER — TELEPHONE (OUTPATIENT)
Age: 55
End: 2025-05-30

## 2025-05-30 NOTE — TELEPHONE ENCOUNTER
Attempted to contact patient regarding upcoming Medicare wellness appointment and completion of HRA questionnaire. LVM for patient to please return call at  [phone].

## 2025-08-01 DIAGNOSIS — E55.9 VITAMIN D DEFICIENCY, UNSPECIFIED: ICD-10-CM

## 2025-08-01 DIAGNOSIS — E29.1 HYPOGONADISM IN MALE: ICD-10-CM

## 2025-08-01 DIAGNOSIS — R94.8 ABNORMAL RESULTS OF FUNCTION STUDIES OF OTHER ORGANS AND SYSTEMS: ICD-10-CM

## 2025-08-01 DIAGNOSIS — E11.65 TYPE 2 DIABETES MELLITUS WITH HYPERGLYCEMIA, WITHOUT LONG-TERM CURRENT USE OF INSULIN (HCC): ICD-10-CM

## 2025-08-01 DIAGNOSIS — E23.0 KALLMAN SYNDROME: ICD-10-CM

## 2025-08-01 DIAGNOSIS — M81.8 OTHER OSTEOPOROSIS WITHOUT CURRENT PATHOLOGICAL FRACTURE: ICD-10-CM
